# Patient Record
Sex: MALE | Race: WHITE | NOT HISPANIC OR LATINO | Employment: OTHER | URBAN - METROPOLITAN AREA
[De-identification: names, ages, dates, MRNs, and addresses within clinical notes are randomized per-mention and may not be internally consistent; named-entity substitution may affect disease eponyms.]

---

## 2021-06-24 ENCOUNTER — HOSPITAL ENCOUNTER (INPATIENT)
Facility: MEDICAL CENTER | Age: 76
LOS: 2 days | DRG: 176 | End: 2021-06-27
Attending: EMERGENCY MEDICINE | Admitting: INTERNAL MEDICINE
Payer: MEDICARE

## 2021-06-24 ENCOUNTER — HOSPITAL ENCOUNTER (OUTPATIENT)
Dept: RADIOLOGY | Facility: MEDICAL CENTER | Age: 76
End: 2021-06-24
Payer: MEDICARE

## 2021-06-24 ENCOUNTER — APPOINTMENT (OUTPATIENT)
Dept: CARDIOLOGY | Facility: MEDICAL CENTER | Age: 76
DRG: 176 | End: 2021-06-24
Attending: STUDENT IN AN ORGANIZED HEALTH CARE EDUCATION/TRAINING PROGRAM
Payer: MEDICARE

## 2021-06-24 DIAGNOSIS — I26.99 ACUTE PULMONARY EMBOLISM, UNSPECIFIED PULMONARY EMBOLISM TYPE, UNSPECIFIED WHETHER ACUTE COR PULMONALE PRESENT (HCC): ICD-10-CM

## 2021-06-24 DIAGNOSIS — I47.20 VENTRICULAR TACHYCARDIA (HCC): ICD-10-CM

## 2021-06-24 PROBLEM — E11.9 TYPE 2 DIABETES MELLITUS (HCC): Status: ACTIVE | Noted: 2021-06-24

## 2021-06-24 PROBLEM — N40.0 BPH (BENIGN PROSTATIC HYPERPLASIA): Status: ACTIVE | Noted: 2021-06-24

## 2021-06-24 PROBLEM — E78.5 DYSLIPIDEMIA: Status: ACTIVE | Noted: 2021-06-24

## 2021-06-24 LAB
ALBUMIN SERPL BCP-MCNC: 4 G/DL (ref 3.2–4.9)
ALBUMIN/GLOB SERPL: 1.3 G/DL
ALP SERPL-CCNC: 77 U/L (ref 30–99)
ALT SERPL-CCNC: 15 U/L (ref 2–50)
ANION GAP SERPL CALC-SCNC: 12 MMOL/L (ref 7–16)
APTT PPP: 125.8 SEC (ref 24.7–36)
AST SERPL-CCNC: 22 U/L (ref 12–45)
BASOPHILS # BLD AUTO: 0.5 % (ref 0–1.8)
BASOPHILS # BLD: 0.03 K/UL (ref 0–0.12)
BILIRUB SERPL-MCNC: 0.7 MG/DL (ref 0.1–1.5)
BUN SERPL-MCNC: 18 MG/DL (ref 8–22)
CALCIUM SERPL-MCNC: 8.6 MG/DL (ref 8.5–10.5)
CHLORIDE SERPL-SCNC: 107 MMOL/L (ref 96–112)
CO2 SERPL-SCNC: 22 MMOL/L (ref 20–33)
CREAT SERPL-MCNC: 0.8 MG/DL (ref 0.5–1.4)
EKG IMPRESSION: NORMAL
EOSINOPHIL # BLD AUTO: 0.15 K/UL (ref 0–0.51)
EOSINOPHIL NFR BLD: 2.4 % (ref 0–6.9)
ERYTHROCYTE [DISTWIDTH] IN BLOOD BY AUTOMATED COUNT: 43.8 FL (ref 35.9–50)
FLUAV RNA SPEC QL NAA+PROBE: NEGATIVE
FLUBV RNA SPEC QL NAA+PROBE: NEGATIVE
GLOBULIN SER CALC-MCNC: 3 G/DL (ref 1.9–3.5)
GLUCOSE BLD-MCNC: 182 MG/DL (ref 65–99)
GLUCOSE SERPL-MCNC: 125 MG/DL (ref 65–99)
HCT VFR BLD AUTO: 40.1 % (ref 42–52)
HGB BLD-MCNC: 14.2 G/DL (ref 14–18)
IMM GRANULOCYTES # BLD AUTO: 0.01 K/UL (ref 0–0.11)
IMM GRANULOCYTES NFR BLD AUTO: 0.2 % (ref 0–0.9)
INR PPP: 1.13 (ref 0.87–1.13)
LV EJECT FRACT  99904: 40
LV EJECT FRACT MOD 2C 99903: 42.45
LV EJECT FRACT MOD 4C 99902: 36.95
LV EJECT FRACT MOD BP 99901: 39.45
LYMPHOCYTES # BLD AUTO: 1.3 K/UL (ref 1–4.8)
LYMPHOCYTES NFR BLD: 20.9 % (ref 22–41)
MAGNESIUM SERPL-MCNC: 2.1 MG/DL (ref 1.5–2.5)
MCH RBC QN AUTO: 33.2 PG (ref 27–33)
MCHC RBC AUTO-ENTMCNC: 35.4 G/DL (ref 33.7–35.3)
MCV RBC AUTO: 93.7 FL (ref 81.4–97.8)
MONOCYTES # BLD AUTO: 0.44 K/UL (ref 0–0.85)
MONOCYTES NFR BLD AUTO: 7.1 % (ref 0–13.4)
NEUTROPHILS # BLD AUTO: 4.3 K/UL (ref 1.82–7.42)
NEUTROPHILS NFR BLD: 68.9 % (ref 44–72)
NRBC # BLD AUTO: 0 K/UL
NRBC BLD-RTO: 0 /100 WBC
PLATELET # BLD AUTO: 120 K/UL (ref 164–446)
PMV BLD AUTO: 8.8 FL (ref 9–12.9)
POTASSIUM SERPL-SCNC: 3.6 MMOL/L (ref 3.6–5.5)
PROT SERPL-MCNC: 7 G/DL (ref 6–8.2)
PROTHROMBIN TIME: 14.2 SEC (ref 12–14.6)
RBC # BLD AUTO: 4.28 M/UL (ref 4.7–6.1)
RSV RNA SPEC QL NAA+PROBE: NEGATIVE
SARS-COV-2 RNA RESP QL NAA+PROBE: NOTDETECTED
SODIUM SERPL-SCNC: 141 MMOL/L (ref 135–145)
SPECIMEN SOURCE: NORMAL
TROPONIN T SERPL-MCNC: 14 NG/L (ref 6–19)
UFH PPP CHRO-ACNC: 0.4 IU/ML
UFH PPP CHRO-ACNC: 0.58 IU/ML
WBC # BLD AUTO: 6.2 K/UL (ref 4.8–10.8)

## 2021-06-24 PROCEDURE — 93010 ELECTROCARDIOGRAM REPORT: CPT | Performed by: INTERNAL MEDICINE

## 2021-06-24 PROCEDURE — 99219 PR INITIAL OBSERVATION CARE,LEVL II: CPT | Performed by: INTERNAL MEDICINE

## 2021-06-24 PROCEDURE — 700111 HCHG RX REV CODE 636 W/ 250 OVERRIDE (IP): Performed by: INTERNAL MEDICINE

## 2021-06-24 PROCEDURE — 96365 THER/PROPH/DIAG IV INF INIT: CPT

## 2021-06-24 PROCEDURE — 80053 COMPREHEN METABOLIC PANEL: CPT

## 2021-06-24 PROCEDURE — 99204 OFFICE O/P NEW MOD 45 MIN: CPT | Performed by: STUDENT IN AN ORGANIZED HEALTH CARE EDUCATION/TRAINING PROGRAM

## 2021-06-24 PROCEDURE — 85520 HEPARIN ASSAY: CPT

## 2021-06-24 PROCEDURE — 85610 PROTHROMBIN TIME: CPT

## 2021-06-24 PROCEDURE — 36415 COLL VENOUS BLD VENIPUNCTURE: CPT

## 2021-06-24 PROCEDURE — 700111 HCHG RX REV CODE 636 W/ 250 OVERRIDE (IP): Performed by: EMERGENCY MEDICINE

## 2021-06-24 PROCEDURE — 93306 TTE W/DOPPLER COMPLETE: CPT | Mod: 26 | Performed by: INTERNAL MEDICINE

## 2021-06-24 PROCEDURE — 700111 HCHG RX REV CODE 636 W/ 250 OVERRIDE (IP): Performed by: STUDENT IN AN ORGANIZED HEALTH CARE EDUCATION/TRAINING PROGRAM

## 2021-06-24 PROCEDURE — 96366 THER/PROPH/DIAG IV INF ADDON: CPT

## 2021-06-24 PROCEDURE — G0378 HOSPITAL OBSERVATION PER HR: HCPCS

## 2021-06-24 PROCEDURE — 84484 ASSAY OF TROPONIN QUANT: CPT

## 2021-06-24 PROCEDURE — 82962 GLUCOSE BLOOD TEST: CPT

## 2021-06-24 PROCEDURE — 700105 HCHG RX REV CODE 258: Performed by: EMERGENCY MEDICINE

## 2021-06-24 PROCEDURE — 0241U HCHG SARS-COV-2 COVID-19 NFCT DS RESP RNA 4 TRGT MIC: CPT

## 2021-06-24 PROCEDURE — 700102 HCHG RX REV CODE 250 W/ 637 OVERRIDE(OP): Performed by: INTERNAL MEDICINE

## 2021-06-24 PROCEDURE — 83735 ASSAY OF MAGNESIUM: CPT

## 2021-06-24 PROCEDURE — 96372 THER/PROPH/DIAG INJ SC/IM: CPT

## 2021-06-24 PROCEDURE — 96375 TX/PRO/DX INJ NEW DRUG ADDON: CPT

## 2021-06-24 PROCEDURE — 85730 THROMBOPLASTIN TIME PARTIAL: CPT

## 2021-06-24 PROCEDURE — C9803 HOPD COVID-19 SPEC COLLECT: HCPCS | Performed by: EMERGENCY MEDICINE

## 2021-06-24 PROCEDURE — A9270 NON-COVERED ITEM OR SERVICE: HCPCS | Performed by: INTERNAL MEDICINE

## 2021-06-24 PROCEDURE — 700101 HCHG RX REV CODE 250: Performed by: STUDENT IN AN ORGANIZED HEALTH CARE EDUCATION/TRAINING PROGRAM

## 2021-06-24 PROCEDURE — 99285 EMERGENCY DEPT VISIT HI MDM: CPT

## 2021-06-24 PROCEDURE — 700102 HCHG RX REV CODE 250 W/ 637 OVERRIDE(OP): Performed by: STUDENT IN AN ORGANIZED HEALTH CARE EDUCATION/TRAINING PROGRAM

## 2021-06-24 PROCEDURE — 93005 ELECTROCARDIOGRAM TRACING: CPT | Performed by: EMERGENCY MEDICINE

## 2021-06-24 PROCEDURE — 85025 COMPLETE CBC W/AUTO DIFF WBC: CPT

## 2021-06-24 PROCEDURE — 96368 THER/DIAG CONCURRENT INF: CPT

## 2021-06-24 PROCEDURE — A9270 NON-COVERED ITEM OR SERVICE: HCPCS | Performed by: STUDENT IN AN ORGANIZED HEALTH CARE EDUCATION/TRAINING PROGRAM

## 2021-06-24 PROCEDURE — 96367 TX/PROPH/DG ADDL SEQ IV INF: CPT

## 2021-06-24 PROCEDURE — 93306 TTE W/DOPPLER COMPLETE: CPT

## 2021-06-24 RX ORDER — SODIUM CHLORIDE 9 MG/ML
INJECTION, SOLUTION INTRAVENOUS CONTINUOUS
Status: DISCONTINUED | OUTPATIENT
Start: 2021-06-24 | End: 2021-06-25

## 2021-06-24 RX ORDER — METOPROLOL TARTRATE 1 MG/ML
5 INJECTION, SOLUTION INTRAVENOUS ONCE
Status: COMPLETED | OUTPATIENT
Start: 2021-06-24 | End: 2021-06-24

## 2021-06-24 RX ORDER — ONDANSETRON 2 MG/ML
4 INJECTION INTRAMUSCULAR; INTRAVENOUS EVERY 4 HOURS PRN
Status: DISCONTINUED | OUTPATIENT
Start: 2021-06-24 | End: 2021-06-27 | Stop reason: HOSPADM

## 2021-06-24 RX ORDER — ATORVASTATIN CALCIUM 10 MG/1
10 TABLET, FILM COATED ORAL NIGHTLY
Status: DISCONTINUED | OUTPATIENT
Start: 2021-06-24 | End: 2021-06-24

## 2021-06-24 RX ORDER — DEXTROSE MONOHYDRATE 50 MG/ML
INJECTION, SOLUTION INTRAVENOUS CONTINUOUS
Status: DISCONTINUED | OUTPATIENT
Start: 2021-06-24 | End: 2021-06-26

## 2021-06-24 RX ORDER — BISACODYL 10 MG
10 SUPPOSITORY, RECTAL RECTAL
Status: DISCONTINUED | OUTPATIENT
Start: 2021-06-24 | End: 2021-06-27 | Stop reason: HOSPADM

## 2021-06-24 RX ORDER — TAMSULOSIN HYDROCHLORIDE 0.4 MG/1
0.4 CAPSULE ORAL
Status: DISCONTINUED | OUTPATIENT
Start: 2021-06-25 | End: 2021-06-24

## 2021-06-24 RX ORDER — POLYETHYLENE GLYCOL 3350 17 G/17G
1 POWDER, FOR SOLUTION ORAL
Status: DISCONTINUED | OUTPATIENT
Start: 2021-06-24 | End: 2021-06-27 | Stop reason: HOSPADM

## 2021-06-24 RX ORDER — ONDANSETRON 4 MG/1
4 TABLET, ORALLY DISINTEGRATING ORAL EVERY 4 HOURS PRN
Status: DISCONTINUED | OUTPATIENT
Start: 2021-06-24 | End: 2021-06-27 | Stop reason: HOSPADM

## 2021-06-24 RX ORDER — ACETAMINOPHEN 325 MG/1
650 TABLET ORAL EVERY 6 HOURS PRN
Status: DISCONTINUED | OUTPATIENT
Start: 2021-06-24 | End: 2021-06-27 | Stop reason: HOSPADM

## 2021-06-24 RX ORDER — TAMSULOSIN HYDROCHLORIDE 0.4 MG/1
0.4 CAPSULE ORAL
COMMUNITY

## 2021-06-24 RX ORDER — METOPROLOL SUCCINATE 25 MG/1
25 TABLET, EXTENDED RELEASE ORAL
Status: DISCONTINUED | OUTPATIENT
Start: 2021-06-24 | End: 2021-06-26

## 2021-06-24 RX ORDER — TAMSULOSIN HYDROCHLORIDE 0.4 MG/1
0.4 CAPSULE ORAL
Status: DISCONTINUED | OUTPATIENT
Start: 2021-06-24 | End: 2021-06-27 | Stop reason: HOSPADM

## 2021-06-24 RX ORDER — ATORVASTATIN CALCIUM 10 MG/1
10 TABLET, FILM COATED ORAL NIGHTLY
Status: ON HOLD | COMMUNITY
End: 2021-06-27

## 2021-06-24 RX ORDER — MORPHINE SULFATE 4 MG/ML
4 INJECTION, SOLUTION INTRAMUSCULAR; INTRAVENOUS
Status: DISCONTINUED | OUTPATIENT
Start: 2021-06-24 | End: 2021-06-27 | Stop reason: HOSPADM

## 2021-06-24 RX ORDER — OMEPRAZOLE 20 MG/1
40 CAPSULE, DELAYED RELEASE ORAL DAILY
Status: DISCONTINUED | OUTPATIENT
Start: 2021-06-25 | End: 2021-06-27 | Stop reason: HOSPADM

## 2021-06-24 RX ORDER — HEPARIN SODIUM 5000 [USP'U]/100ML
0-30 INJECTION, SOLUTION INTRAVENOUS CONTINUOUS
Status: DISCONTINUED | OUTPATIENT
Start: 2021-06-24 | End: 2021-06-27

## 2021-06-24 RX ORDER — ATORVASTATIN CALCIUM 40 MG/1
40 TABLET, FILM COATED ORAL NIGHTLY
Status: DISCONTINUED | OUTPATIENT
Start: 2021-06-24 | End: 2021-06-27 | Stop reason: HOSPADM

## 2021-06-24 RX ORDER — HEPARIN SODIUM 1000 [USP'U]/ML
40 INJECTION, SOLUTION INTRAVENOUS; SUBCUTANEOUS PRN
Status: DISCONTINUED | OUTPATIENT
Start: 2021-06-24 | End: 2021-06-27

## 2021-06-24 RX ORDER — ENALAPRILAT 1.25 MG/ML
1.25 INJECTION INTRAVENOUS EVERY 6 HOURS PRN
Status: DISCONTINUED | OUTPATIENT
Start: 2021-06-24 | End: 2021-06-27 | Stop reason: HOSPADM

## 2021-06-24 RX ORDER — HEPARIN SODIUM 1000 [USP'U]/ML
80 INJECTION, SOLUTION INTRAVENOUS; SUBCUTANEOUS ONCE
Status: DISCONTINUED | OUTPATIENT
Start: 2021-06-24 | End: 2021-06-24

## 2021-06-24 RX ORDER — HEPARIN SODIUM 5000 [USP'U]/100ML
0-30 INJECTION, SOLUTION INTRAVENOUS CONTINUOUS
Status: DISCONTINUED | OUTPATIENT
Start: 2021-06-24 | End: 2021-06-24

## 2021-06-24 RX ORDER — ICOSAPENT ETHYL 1000 MG/1
2 CAPSULE ORAL 2 TIMES DAILY
COMMUNITY

## 2021-06-24 RX ORDER — AMOXICILLIN 250 MG
2 CAPSULE ORAL 2 TIMES DAILY
Status: DISCONTINUED | OUTPATIENT
Start: 2021-06-24 | End: 2021-06-27 | Stop reason: HOSPADM

## 2021-06-24 RX ORDER — LABETALOL HYDROCHLORIDE 5 MG/ML
10 INJECTION, SOLUTION INTRAVENOUS EVERY 4 HOURS PRN
Status: DISCONTINUED | OUTPATIENT
Start: 2021-06-24 | End: 2021-06-27 | Stop reason: HOSPADM

## 2021-06-24 RX ORDER — ICOSAPENT ETHYL 1000 MG/1
2 CAPSULE ORAL 2 TIMES DAILY
Status: DISCONTINUED | OUTPATIENT
Start: 2021-06-24 | End: 2021-06-24

## 2021-06-24 RX ORDER — DEXTROSE MONOHYDRATE 25 G/50ML
50 INJECTION, SOLUTION INTRAVENOUS
Status: DISCONTINUED | OUTPATIENT
Start: 2021-06-24 | End: 2021-06-27 | Stop reason: HOSPADM

## 2021-06-24 RX ORDER — PANTOPRAZOLE SODIUM 40 MG/1
40 TABLET, DELAYED RELEASE ORAL DAILY
COMMUNITY

## 2021-06-24 RX ORDER — OXYCODONE HYDROCHLORIDE 10 MG/1
10 TABLET ORAL
Status: DISCONTINUED | OUTPATIENT
Start: 2021-06-24 | End: 2021-06-27 | Stop reason: HOSPADM

## 2021-06-24 RX ORDER — HEPARIN SODIUM 1000 [USP'U]/ML
40 INJECTION, SOLUTION INTRAVENOUS; SUBCUTANEOUS PRN
Status: DISCONTINUED | OUTPATIENT
Start: 2021-06-24 | End: 2021-06-24

## 2021-06-24 RX ORDER — OXYCODONE HYDROCHLORIDE 5 MG/1
5 TABLET ORAL
Status: DISCONTINUED | OUTPATIENT
Start: 2021-06-24 | End: 2021-06-27 | Stop reason: HOSPADM

## 2021-06-24 RX ADMIN — METOPROLOL SUCCINATE 25 MG: 25 TABLET, EXTENDED RELEASE ORAL at 18:05

## 2021-06-24 RX ADMIN — AMIODARONE HYDROCHLORIDE 1 MG/MIN: 1.8 INJECTION, SOLUTION INTRAVENOUS at 16:30

## 2021-06-24 RX ADMIN — SODIUM CHLORIDE: 9 INJECTION, SOLUTION INTRAVENOUS at 15:46

## 2021-06-24 RX ADMIN — TAMSULOSIN HYDROCHLORIDE 0.4 MG: 0.4 CAPSULE ORAL at 22:14

## 2021-06-24 RX ADMIN — HEPARIN SODIUM 18 UNITS/KG/HR: 5000 INJECTION, SOLUTION INTRAVENOUS at 17:02

## 2021-06-24 RX ADMIN — METOPROLOL TARTRATE 5 MG: 1 INJECTION, SOLUTION INTRAVENOUS at 17:26

## 2021-06-24 RX ADMIN — ATORVASTATIN CALCIUM 40 MG: 40 TABLET, FILM COATED ORAL at 22:14

## 2021-06-24 RX ADMIN — DEXTROSE MONOHYDRATE: 50 INJECTION, SOLUTION INTRAVENOUS at 17:45

## 2021-06-24 RX ADMIN — AMIODARONE HYDROCHLORIDE 150 MG: 1.5 INJECTION, SOLUTION INTRAVENOUS at 17:20

## 2021-06-24 RX ADMIN — AMIODARONE HYDROCHLORIDE 0.5 MG/MIN: 1.8 INJECTION, SOLUTION INTRAVENOUS at 23:10

## 2021-06-24 RX ADMIN — INSULIN HUMAN 1 UNITS: 100 INJECTION, SOLUTION PARENTERAL at 22:16

## 2021-06-24 RX ADMIN — ASPIRIN 81 MG: 81 TABLET, COATED ORAL at 18:05

## 2021-06-24 ASSESSMENT — ENCOUNTER SYMPTOMS
BLURRED VISION: 0
HEARTBURN: 0
NECK PAIN: 0
TREMORS: 0
BACK PAIN: 0
NAUSEA: 0
CHILLS: 0
FOCAL WEAKNESS: 0
HEADACHES: 0
SHORTNESS OF BREATH: 1
SPUTUM PRODUCTION: 0
HALLUCINATIONS: 0
PHOTOPHOBIA: 0
SPEECH CHANGE: 0
ORTHOPNEA: 0
DOUBLE VISION: 0
FEVER: 0
VOMITING: 0
POLYDIPSIA: 0
NERVOUS/ANXIOUS: 0
FLANK PAIN: 0
HEMOPTYSIS: 0
COUGH: 1
WEIGHT LOSS: 0
PALPITATIONS: 0
BRUISES/BLEEDS EASILY: 0

## 2021-06-24 ASSESSMENT — LIFESTYLE VARIABLES: SUBSTANCE_ABUSE: 0

## 2021-06-24 NOTE — ASSESSMENT & PLAN NOTE
IV amiodarone has been discontinued  Transthoracic echo reveals LVEF of 40%  Partha for cardiac catheterization today  Monitor on telemetry  Hold metoprolol for bradycardia

## 2021-06-24 NOTE — CONSULTS
Reason for Consult:  Asked by Dr Madi Jefferson M.D. to see this patient with shortness of breath  Patient's PCP: No primary care provider on file.    CC:   Chief Complaint   Patient presents with   • Shortness of Breath     transfer in with dx PE   • Other     episodes non sustained V-tach.       HPI: Judd Dodson is a 75-year-old man with history of of diabetes, dyslipidemia, and carotid artery disease (medically managed) who presented with shortness of breath that started Sunday. He presented to an ER in [place] due to worsening symptoms, and was diagnosed with PE.  The patient was transferred to [facility] for further management of PE.  While in the ER at [place], there was report of nonsustained V. tach, for which cardiology was consulted.    The patient reports that he started having shortness of breath on Sunday and thought it was due to high altitude.  However, the symptoms worsened and he decided to go to the ER.  The patient was started on heparin drip for PE and amiodarone drip for NSVT and transferred here.      He denies any prior CAD or heart failure.  He denies any chest pressure.  Denies any palpitation.  No orthopnea, PND, or leg swelling.  No syncope or presyncopal episodes.  Of note, bedside echo showed depressed LVEF.  The patient also had a brief nonsustained VT while I was evaluating him.  The patient did not lose consciousness and was asymptomatic during the episode.    Medications / Drug list prior to admission:  No current facility-administered medications on file prior to encounter.     Current Outpatient Medications on File Prior to Encounter   Medication Sig Dispense Refill   • pantoprazole (PROTONIX) 40 MG Tablet Delayed Response Take 40 mg by mouth every day.     • metFORMIN (GLUCOPHAGE) 500 MG Tab Take 500 mg by mouth 2 times a day with meals.     • atorvastatin (LIPITOR) 10 MG Tab Take 10 mg by mouth every evening.     • tamsulosin (FLOMAX) 0.4 MG capsule Take 0.4 mg by mouth 1/2 hour after  breakfast.     • Icosapent Ethyl 1 g Cap Take 2 g by mouth 2 times a day.         Current list of administered Medications:    Current Facility-Administered Medications:   •  NS infusion, , Intravenous, Continuous, Madi Jefferson M.D., Last Rate: 125 mL/hr at 06/24/21 1546, New Bag at 06/24/21 1546  •  dextrose 5% infusion, , Intravenous, Continuous, Madi Jefferson M.D.  •  amiodarone (NEXTERONE) 360 mg/200 mL infusion, 0.5-1 mg/min, Intravenous, Continuous, Keith Rascon M.D., Stopped at 06/24/21 1719  •  heparin infusion 25,000 units in 500 mL 0.45% NACL, 0-30 Units/kg/hr, Intravenous, Continuous, Keith Rascon M.D., Last Rate: 32.4 mL/hr at 06/24/21 1702, 18 Units/kg/hr at 06/24/21 1702  •  heparin injection 3,600 Units, 40 Units/kg, Intravenous, PRN, Keith Rascon M.D.  •  senna-docusate (PERICOLACE or SENOKOT S) 8.6-50 MG per tablet 2 tablet, 2 tablet, Oral, BID **AND** polyethylene glycol/lytes (MIRALAX) PACKET 1 Packet, 1 Packet, Oral, QDAY PRN **AND** magnesium hydroxide (MILK OF MAGNESIA) suspension 30 mL, 30 mL, Oral, QDAY PRN **AND** bisacodyl (DULCOLAX) suppository 10 mg, 10 mg, Rectal, QDAY PRN, Keith Rascon M.D.  •  Respiratory Therapy Consult, , Nebulization, Continuous RT, Keith Rascon M.D.  •  enalaprilat (VASOTEC) injection 1.25 mg, 1.25 mg, Intravenous, Q6HRS PRN, Keith Rascon M.D.  •  labetalol (NORMODYNE/TRANDATE) injection 10 mg, 10 mg, Intravenous, Q4HRS PRN, Keith Rascon M.D.  •  acetaminophen (Tylenol) tablet 650 mg, 650 mg, Oral, Q6HRS PRN, Keith Rascon M.D.  •  Notify provider if pain remains uncontrolled, , , CONTINUOUS **AND** Use the Numeric Rating Scale (NRS), Sanchez-Baker Faces (WBF), or FLACC on regular floors and Critical-Care Pain Observation Tool (CPOT) on ICUs/Trauma to assess pain, , , CONTINUOUS **AND** Pulse Ox, , , CONTINUOUS **AND** Pharmacy Consult Request ...Pain Management Review 1 Each, 1 Each, Other, PHARMACY TO DOSE **AND** If patient  difficult to arouse and/or has respiratory depression (respiratory rate of 10 or less), stop any opiates that are currently infusing and call a Rapid Response., , , CONTINUOUS, Keith Rascon M.D.  •  oxyCODONE immediate-release (ROXICODONE) tablet 5 mg, 5 mg, Oral, Q3HRS PRN **OR** oxyCODONE immediate release (ROXICODONE) tablet 10 mg, 10 mg, Oral, Q3HRS PRN **OR** morphine (pf) 4 mg/mL injection 4 mg, 4 mg, Intravenous, Q3HRS PRN, Keith Rascon M.D.  •  ondansetron (ZOFRAN) syringe/vial injection 4 mg, 4 mg, Intravenous, Q4HRS PRN, Keith Rascon M.D.  •  ondansetron (ZOFRAN ODT) dispertab 4 mg, 4 mg, Oral, Q4HRS PRN, Keith Rascon M.D.  •  [START ON 6/25/2021] omeprazole (PRILOSEC) capsule 40 mg, 40 mg, Oral, DAILY, Keith Rascon M.D.  •  [START ON 6/25/2021] tamsulosin (FLOMAX) capsule 0.4 mg, 0.4 mg, Oral, AFTER BREAKFAST, Keith Rascon M.D.  •  insulin regular (HumuLIN R,NovoLIN R) injection, 1-6 Units, Subcutaneous, 4X/DAY ACHS **AND** POC blood glucose manual result, , , Q AC AND BEDTIME(S) **AND** NOTIFY MD and PharmD, , , Once **AND** glucose 4 g chewable tablet 16 g, 16 g, Oral, Q15 MIN PRN **AND** dextrose 50% (D50W) injection 50 mL, 50 mL, Intravenous, Q15 MIN PRN, Keith Rascon M.D.  •  metoprolol SR (TOPROL XL) tablet 25 mg, 25 mg, Oral, Q DAY, Melva Caldwell M.D.  •  amiodarone (NEXTERONE) IVPB 150 mg, 150 mg, Intravenous, Once, Melva Caldwell M.D., Last Rate: 600 mL/hr at 06/24/21 1720, 150 mg at 06/24/21 1720  •  atorvastatin (LIPITOR) tablet 40 mg, 40 mg, Oral, Nightly, Melva Caldwell M.D.  •  aspirin EC (ECOTRIN) tablet 81 mg, 81 mg, Oral, DAILY, Melva Caldwell M.D.  •  Metoprolol Tartrate (LOPRESSOR) injection 5 mg, 5 mg, Intravenous, Once, Melva Caldwell M.D.    Current Outpatient Medications:   •  pantoprazole (PROTONIX) 40 MG Tablet Delayed Response, Take 40 mg by mouth every day., Disp: , Rfl:   •  metFORMIN (GLUCOPHAGE) 500 MG Tab, Take 500 mg by mouth 2 times a day  with meals., Disp: , Rfl:   •  atorvastatin (LIPITOR) 10 MG Tab, Take 10 mg by mouth every evening., Disp: , Rfl:   •  tamsulosin (FLOMAX) 0.4 MG capsule, Take 0.4 mg by mouth 1/2 hour after breakfast., Disp: , Rfl:   •  Icosapent Ethyl 1 g Cap, Take 2 g by mouth 2 times a day., Disp: , Rfl:     No past medical history on file.    No past surgical history on file.    No family history on file.  Patient family history was personally reviewed, no pertinent family history to current presentation    Social History     Tobacco Use   • Smoking status: Not on file   Substance Use Topics   • Alcohol use: Not on file   • Drug use: Not on file       ALLERGIES:  No Known Allergies    Review of systems:  A complete review of symptoms was reviewed with patient. This is reviewed in H&P and PMH. ALL OTHERS reviewed and negative    Physical exam:  Patient Vitals for the past 24 hrs:   BP Temp Temp src Pulse Resp SpO2 Height Weight   06/24/21 1516 140/86 36.8 °C (98.2 °F) Temporal 76 18 97 % -- --   06/24/21 1512 -- -- -- -- -- -- 1.829 m (6') 90 kg (198 lb 6.6 oz)     General: No acute distress.   EYES: no jaundice  HEENT: OP clear   Neck: No bruits No JVD.   CVS:  RRR. S1 + S2. No M/R/G  Resp: CTAB. No wheezing or crackles/rhonchi.  Abdomen: Soft, NT, ND,  Skin: Grossly nothing acute no obvious rashes  Neurological: Alert, Moves all extremities, no cranial nerve defects on limited exam  Extremities: Pulse 2+ in b/l LE.  No edema. No cyanosis.       Data:  Laboratory studies personally reviewed by me:  Recent Results (from the past 24 hour(s))   CBC w/ Differential    Collection Time: 06/24/21  3:45 PM   Result Value Ref Range    WBC 6.2 4.8 - 10.8 K/uL    RBC 4.28 (L) 4.70 - 6.10 M/uL    Hemoglobin 14.2 14.0 - 18.0 g/dL    Hematocrit 40.1 (L) 42.0 - 52.0 %    MCV 93.7 81.4 - 97.8 fL    MCH 33.2 (H) 27.0 - 33.0 pg    MCHC 35.4 (H) 33.7 - 35.3 g/dL    RDW 43.8 35.9 - 50.0 fL    Platelet Count 120 (L) 164 - 446 K/uL    MPV 8.8 (L)  9.0 - 12.9 fL    Neutrophils-Polys 68.90 44.00 - 72.00 %    Lymphocytes 20.90 (L) 22.00 - 41.00 %    Monocytes 7.10 0.00 - 13.40 %    Eosinophils 2.40 0.00 - 6.90 %    Basophils 0.50 0.00 - 1.80 %    Immature Granulocytes 0.20 0.00 - 0.90 %    Nucleated RBC 0.00 /100 WBC    Neutrophils (Absolute) 4.30 1.82 - 7.42 K/uL    Lymphs (Absolute) 1.30 1.00 - 4.80 K/uL    Monos (Absolute) 0.44 0.00 - 0.85 K/uL    Eos (Absolute) 0.15 0.00 - 0.51 K/uL    Baso (Absolute) 0.03 0.00 - 0.12 K/uL    Immature Granulocytes (abs) 0.01 0.00 - 0.11 K/uL    NRBC (Absolute) 0.00 K/uL   Complete Metabolic Panel (CMP)    Collection Time: 06/24/21  3:45 PM   Result Value Ref Range    Sodium 141 135 - 145 mmol/L    Potassium 3.6 3.6 - 5.5 mmol/L    Chloride 107 96 - 112 mmol/L    Co2 22 20 - 33 mmol/L    Anion Gap 12.0 7.0 - 16.0    Glucose 125 (H) 65 - 99 mg/dL    Bun 18 8 - 22 mg/dL    Creatinine 0.80 0.50 - 1.40 mg/dL    Calcium 8.6 8.5 - 10.5 mg/dL    AST(SGOT) 22 12 - 45 U/L    ALT(SGPT) 15 2 - 50 U/L    Alkaline Phosphatase 77 30 - 99 U/L    Total Bilirubin 0.7 0.1 - 1.5 mg/dL    Albumin 4.0 3.2 - 4.9 g/dL    Total Protein 7.0 6.0 - 8.2 g/dL    Globulin 3.0 1.9 - 3.5 g/dL    A-G Ratio 1.3 g/dL   Troponin STAT    Collection Time: 06/24/21  3:45 PM   Result Value Ref Range    Troponin T 14 6 - 19 ng/L   aPTT    Collection Time: 06/24/21  3:45 PM   Result Value Ref Range    APTT 125.8 (HH) 24.7 - 36.0 sec   Prothrombin Time    Collection Time: 06/24/21  3:45 PM   Result Value Ref Range    PT 14.2 12.0 - 14.6 sec    INR 1.13 0.87 - 1.13   Heparin Xa (Unfractionated)    Collection Time: 06/24/21  3:45 PM   Result Value Ref Range    Heparin Xa (UFH) 0.58 IU/mL   ESTIMATED GFR    Collection Time: 06/24/21  3:45 PM   Result Value Ref Range    GFR If African American >60 >60 mL/min/1.73 m 2    GFR If Non African American >60 >60 mL/min/1.73 m 2   COV-2, FLU A/B, AND RSV BY PCR (2-4 HOURS CEPHEID): Collect NP swab in VTM    Collection Time:  06/24/21  5:05 PM    Specimen: Respirate   Result Value Ref Range    SARS-CoV-2 Source NP Swab        Imaging:  EC-ECHOCARDIOGRAM COMPLETE W/O CONT         OUTSIDE IMAGES-DX CHEST   Final Result              EKG 6/24/2021 (from [place]): personally reviewed by me sinus rhythm, PVCs, NSVT    All pertinent features of laboratory and imaging reviewed including primary images where applicable      Active Problems:    Pulmonary embolism (HCC) POA: Unknown    Ventricular tachycardia (HCC) POA: Unknown    Type 2 diabetes mellitus (HCC) POA: Unknown    Dyslipidemia POA: Unknown    BPH (benign prostatic hyperplasia) POA: Unknown  Resolved Problems:    * No resolved hospital problems. *      Assessment / Plan:    Nonsustained V. Tach  Shortness of breath  -Obtain echocardiogram to assess LVEF and any structural abnormalities  -Continue heparin gtt  -Continue amiodarone gtt  -Start metoprolol XL 25 mg daily  -Continue aspirin and high intensity statin  -If VT recurs, please page on-call cardiology.  Consider lidocaine gtt if NSVT. If sustained, will need to cardiovert and consider urgent coronary angiogram.   -N.p.o. after midnight for left heart cath tomorrow am    I personally discussed his case with  Dr Madi Jefferson M.D.    It is my pleasure to participate in the care of Mr. Dodson.  Please do not hesitate to contact me with questions or concerns.    Melva Caldwell MD   Cardiologist [facility]    6/24/2021    Please note that this dictation was created using voice recognition software. There may be errors I did not discover before finalizing the note.

## 2021-06-24 NOTE — ASSESSMENT & PLAN NOTE
CT of angiography of the chest showed moderate sized PE on the right side, without evidence of saddle emboli.  There are no signs of RV strain.  continue IV heparin, monitor APTT  We will plan to transition patient to oral anticoagulation by discharge, for indefinite treatment

## 2021-06-24 NOTE — ED TRIAGE NOTES
Pt presents to ED via flight [facility] as transfer from outside facility with dx of PE as well as runs of non sustained V-tach. PT states he has been feeling badly with shortness of air for several days. Pt does sound somewhat winded with exertion but does not show other signs of respiratory distress. Pt arrives with Amiodarone infusion 1mg/minute and Heparin infusion.

## 2021-06-24 NOTE — ED NOTES
Med Rec completed: per pt at bedside.     No ORAL antibiotics in last 30 days    Preferred Pharmacy: [facility] P: [phone]    Pt confirmed following allergies:  No Known Allergies     Pt's home medications:   No current facility-administered medications on file prior to encounter.     Medication Sig   • pantoprazole (PROTONIX) 40 MG Tablet Delayed Response Take 40 mg by mouth every day.   • metFORMIN (GLUCOPHAGE) 500 MG Tab Take 500 mg by mouth 2 times a day with meals.   • atorvastatin (LIPITOR) 10 MG Tab Take 10 mg by mouth every evening.   • tamsulosin (FLOMAX) 0.4 MG capsule Take 0.4 mg by mouth 1/2 hour after breakfast.   • Icosapent Ethyl 1 g Cap Take 2 g by mouth 2 times a day.

## 2021-06-24 NOTE — ED PROVIDER NOTES
ED Provider Note    CHIEF COMPLAINT  Chief Complaint   Patient presents with   • Shortness of Breath     transfer in with dx PE   • Other     episodes non sustained V-tach.       HPI  Judd Dodson is a 75 y.o. male who presents with a report that he lives in the [place] area and went to [place] to spend some time the summer as he usually does and since Sunday he has had shortness of breath and presented to [place]'s emergency department where he was found by CT to have bilateral pulmonary emboli.  In addition during his emergency department course there he had runs of nonsustained V. tach and arrives on heparin after receiving initial amiodarone bolus and then drip.  He says that he feels pretty good but nursing informed me that he had another brief run of V. tach.  He denies any leg pain or swelling or prior clots and denies any other complaints    REVIEW OF SYSTEMS  See HPI for further details. All other systems are negative.     PAST MEDICAL HISTORY  No past medical history on file.    FAMILY HISTORY  No family history on file.    SOCIAL HISTORY       SURGICAL HISTORY  No past surgical history on file.    CURRENT MEDICATIONS  Home Medications     Reviewed by Hesham Tripp (Pharmacy Tech) on 06/24/21 at 1555  Med List Status: Complete   Medication Last Dose Status   atorvastatin (LIPITOR) 10 MG Tab 6/23/2021 Active   Icosapent Ethyl 1 g Cap 6/24/2021 Active   metFORMIN (GLUCOPHAGE) 500 MG Tab 6/24/2021 Active   pantoprazole (PROTONIX) 40 MG Tablet Delayed Response 6/24/2021 Active   tamsulosin (FLOMAX) 0.4 MG capsule 6/24/2021 Active                ALLERGIES  No Known Allergies    PHYSICAL EXAM  VITAL SIGNS: /86   Pulse 76   Temp 36.8 °C (98.2 °F) (Temporal)   Resp 18   Ht 1.829 m (6')   Wt 90 kg (198 lb 6.6 oz)   SpO2 97%   BMI 26.91 kg/m²    Constitutional: Well developed, Well nourished, No acute distress, Non-toxic appearance.   HENT: Normocephalic, Atraumatic, Bilateral external ears normal,  Oropharynx is clear mucous membranes are moist. No oral exudates or nasal discharge.   Eyes: Pupils are equal round and reactive, EOMI, Conjunctiva normal, No discharge.   Neck: Normal range of motion, No tenderness, Supple, No stridor. No meningismus.  Lymphatic: No lymphadenopathy noted.   Cardiovascular: Regular rate and rhythm without murmur rub or gallop.  Thorax & Lungs: Clear breath sounds bilaterally without wheezes, rhonchi or rales. There is no chest wall tenderness.   Abdomen: Soft non-tender non-distended. There is no rebound or guarding. No organomegaly is appreciated. Bowel sounds are normal.  Skin: Normal without rash.   Back: No CVA or spinal tenderness.   Extremities: Intact distal pulses, No edema, No tenderness, No cyanosis, No clubbing. Capillary refill is less than 2 seconds.  Musculoskeletal: Good range of motion in all major joints. No tenderness to palpation or major deformities noted.   Neurologic: Alert & oriented x 3, Normal motor function, Normal sensory function, No focal deficits noted. Reflexes are normal.  Psychiatric: Affect normal, Judgment normal, Mood normal. There is no suicidal ideation or patient reported hallucinations.     EKG  EKG is pending    RADIOLOGY/PROCEDURES  The patient's CAT scan was reviewed that was performed prior to arrival on a as pulmonary emboli bilaterally.  Questionable right heart strain    COURSE & MEDICAL DECISION MAKING  Pertinent Labs & Imaging studies reviewed. (See chart for details)  Patient presents as a transfer from [place] emergency department for pulmonary emboli and ventricular tachycardia that is nonsustained.  He arrives with a pulse of 76 and normal sinus on his rhythm strip and we will obtain EKG    I was able to look at his rhythm strip which did show a run of 8 beats of V. tach here in the emergency department and we will keep him on amiodarone drip as well as heparin.  Vital signs at this time are stable however.    Laboratory  evaluation reveals no leukocytosis, shift, anemia, electrolyte derangements or acidosis and troponin is unremarkable.  Echo is ordered and pending and I spoke with Dr. Lindsey, cardiology who will see the patient in consultation.    Additionally I spoke with the [facility] service will admit the patient for telemetry care and continue both amiodarone and heparin drips.  The patient understands this plan of care    Please note a critical care time of 30 minutes is spent in the care of this patient outside of procedure time.  At risk system is cardiac and respiratory      FINAL IMPRESSION  1. Acute pulmonary embolism, unspecified pulmonary embolism type, unspecified whether acute cor pulmonale present (HCC)    2. Ventricular tachycardia (HCC)    3.  Critical care time, 30 minutes         Electronically signed by: Madi Jefferson M.D., 6/24/2021 4:27 PM

## 2021-06-24 NOTE — H&P
Hospital Medicine History & Physical Note    Date of Service  6/24/2021    Primary Care Physician  No primary care provider on file.    Consultants  Cardiology    Code Status  Full Code    Chief Complaint  Chief Complaint   Patient presents with   • Shortness of Breath     transfer in with dx PE   • Other     episodes non sustained V-tach.       History of Presenting Illness  75 y.o. male with  with past medical history of DVT diagnosed 2 years ago treated with Xarelto temporarily, reported history of intracardial thrombosis several years ago treated with Xarelto for 6 weeks, GERD, dyslipidemia, type 2 diabetes, BPH, who presented 6/24/2021 as a transfer from [facility], where he presented earlier today with complaints of shortness of breath for 4 days, after he arrived to altitude 4 days ago, and it has been getting worse.  Type 2 diabetes patient report dyspnea on exertion, denies orthopnea, cough, lower extremity edema, chest pain.  Patient states he has been taking Xarelto recently.  Troponin was normal.  Sodium 142, potassium 3.8, chloride 105, bicarb 24, anion gap 13, glucose 119, BUN 20, creatinine 1.18, calcium 8.9, total bilirubin 1.0, alkaline phosphatase 82, AST 18, ALT 23  CT of angiography of the chest showed moderate sized PE on the right side, without evidence of saddle emboli.  There was no signs of RV strain.  Initially patient was meant to be discharged home for outpatient management of pulmonary embolism, however he started having runs of V. tach and became progressively more which became progressively longer, with the longest 1 minute.  Patient remained asymptomatic..  He was treated with 150 mg of amiodarone bolus and started on heparin drip and amiodarone drip.  EKG showed no acute ST/T changes, there is a left anterior fascicular block and first-degree AV block, heart rate 76, occasional PVCs.  Elevated D-dimer 2.19 noted.  ERP/Dr. Jefferson consulted with cardiology/ Dr Caldwell.  Plan to  continue IV heparin, n.p.o. at midnight for possible heart catheterization.  TTE ordered    Review of Systems  Review of Systems   Constitutional: Negative for chills, fever and weight loss.   HENT: Negative for ear pain, hearing loss and tinnitus.    Eyes: Negative for blurred vision, double vision and photophobia.   Respiratory: Positive for cough and shortness of breath. Negative for hemoptysis and sputum production.    Cardiovascular: Negative for chest pain, palpitations and orthopnea.   Gastrointestinal: Negative for heartburn, nausea and vomiting.   Genitourinary: Negative for dysuria, flank pain, frequency and hematuria.   Musculoskeletal: Negative for back pain, joint pain and neck pain.   Skin: Negative for itching and rash.   Neurological: Negative for tremors, speech change, focal weakness and headaches.   Endo/Heme/Allergies: Negative for environmental allergies and polydipsia. Does not bruise/bleed easily.   Psychiatric/Behavioral: Negative for hallucinations and substance abuse. The patient is not nervous/anxious.        Past Medical History  DVT  Intracardiac thrombus  Type 2 diabetes  Dyslipidemia  BPH    Surgical History  Denies surgical history    Family History  Patient denies family history of heart disease    Social History  Denies smoking, drinking alcohol or using drugs.    Allergies  No Known Allergies    Medications  Prior to Admission Medications   Prescriptions Last Dose Informant Patient Reported? Taking?   Icosapent Ethyl 1 g Cap 6/24/2021 at am Patient Yes Yes   Sig: Take 2 g by mouth 2 times a day.   atorvastatin (LIPITOR) 10 MG Tab 6/23/2021 at pm Patient Yes Yes   Sig: Take 10 mg by mouth every evening.   metFORMIN (GLUCOPHAGE) 500 MG Tab 6/24/2021 at am Patient Yes Yes   Sig: Take 500 mg by mouth 2 times a day with meals.   pantoprazole (PROTONIX) 40 MG Tablet Delayed Response 6/24/2021 at am Patient Yes Yes   Sig: Take 40 mg by mouth every day.   tamsulosin (FLOMAX) 0.4 MG capsule  6/24/2021 at am Patient Yes Yes   Sig: Take 0.4 mg by mouth 1/2 hour after breakfast.      Facility-Administered Medications: None       Physical Exam  Temp:  [36.8 °C (98.2 °F)] 36.8 °C (98.2 °F)  Pulse:  [76] 76  Resp:  [18] 18  BP: (140)/(86) 140/86  SpO2:  [97 %] 97 %    Physical Exam  Vitals and nursing note reviewed.   Constitutional:       General: He is not in acute distress.     Appearance: Normal appearance.   HENT:      Head: Normocephalic and atraumatic.      Nose: Nose normal.      Mouth/Throat:      Mouth: Mucous membranes are moist.   Eyes:      Extraocular Movements: Extraocular movements intact.      Pupils: Pupils are equal, round, and reactive to light.   Cardiovascular:      Rate and Rhythm: Normal rate and regular rhythm.   Pulmonary:      Effort: Pulmonary effort is normal.      Breath sounds: Normal breath sounds.   Abdominal:      General: Abdomen is flat. There is no distension.      Tenderness: There is no abdominal tenderness.   Musculoskeletal:         General: No swelling or deformity. Normal range of motion.      Cervical back: Normal range of motion and neck supple.   Skin:     General: Skin is warm and dry.   Neurological:      General: No focal deficit present.      Mental Status: He is alert and oriented to person, place, and time.   Psychiatric:         Mood and Affect: Mood normal.         Behavior: Behavior normal.         Laboratory:  Recent Labs     06/24/21  1545   WBC 6.2   RBC 4.28*   HEMOGLOBIN 14.2   HEMATOCRIT 40.1*   MCV 93.7   MCH 33.2*   MCHC 35.4*   RDW 43.8   PLATELETCT 120*   MPV 8.8*     Recent Labs     06/24/21  1545   SODIUM 141   POTASSIUM 3.6   CHLORIDE 107   CO2 22   GLUCOSE 125*   BUN 18   CREATININE 0.80   CALCIUM 8.6     Recent Labs     06/24/21  1545   ALTSGPT 15   ASTSGOT 22   ALKPHOSPHAT 77   TBILIRUBIN 0.7   GLUCOSE 125*         No results for input(s): NTPROBNP in the last 72 hours.      Recent Labs     06/24/21  1545   TROPONINT 14        Imaging:  OUTSIDE IMAGES-DX CHEST   Final Result      EC-ECHOCARDIOGRAM COMPLETE W/O CONT    (Results Pending)         Assessment/Plan:  I anticipate this patient is appropriate for observation status at this time.    Pulmonary embolism (HCC)  Assessment & Plan  CT of angiography of the chest showed moderate sized PE on the right side, without evidence of saddle emboli.  There are no signs of RV strain.  Patient is hemodynamically stable  No indication for alteplase administration  Plan to continue IV heparin   Transthoracic echo is pending for further evaluation  We will plan to transition patient to oral anticoagulation by discharge, for indefinite treatment    BPH (benign prostatic hyperplasia)  Assessment & Plan  Continue tamsulosin    Dyslipidemia  Assessment & Plan  Continue Lipitor,  lcosapent    Type 2 diabetes mellitus (HCC)  Assessment & Plan  Will hold Metformin  Insulin sliding scale    Ventricular tachycardia (HCC)  Assessment & Plan  Plan to continue amiodarone  Transthoracic echo  N.p.o. at midnight for possible heart catheterization tomorrow.  Monitor on telemetry

## 2021-06-25 ENCOUNTER — APPOINTMENT (OUTPATIENT)
Dept: CARDIOLOGY | Facility: MEDICAL CENTER | Age: 76
DRG: 176 | End: 2021-06-25
Attending: STUDENT IN AN ORGANIZED HEALTH CARE EDUCATION/TRAINING PROGRAM
Payer: MEDICARE

## 2021-06-25 PROBLEM — I42.9 CARDIOMYOPATHY (HCC): Status: ACTIVE | Noted: 2021-06-25

## 2021-06-25 LAB
ALBUMIN SERPL BCP-MCNC: 3.9 G/DL (ref 3.2–4.9)
ALBUMIN/GLOB SERPL: 1.4 G/DL
ALP SERPL-CCNC: 74 U/L (ref 30–99)
ALT SERPL-CCNC: 15 U/L (ref 2–50)
ANION GAP SERPL CALC-SCNC: 11 MMOL/L (ref 7–16)
APTT PPP: 119.8 SEC (ref 24.7–36)
AST SERPL-CCNC: 23 U/L (ref 12–45)
BASOPHILS # BLD AUTO: 0.5 % (ref 0–1.8)
BASOPHILS # BLD: 0.03 K/UL (ref 0–0.12)
BILIRUB SERPL-MCNC: 0.6 MG/DL (ref 0.1–1.5)
BUN SERPL-MCNC: 14 MG/DL (ref 8–22)
CALCIUM SERPL-MCNC: 8.7 MG/DL (ref 8.5–10.5)
CHLORIDE SERPL-SCNC: 108 MMOL/L (ref 96–112)
CO2 SERPL-SCNC: 22 MMOL/L (ref 20–33)
CREAT SERPL-MCNC: 0.92 MG/DL (ref 0.5–1.4)
EOSINOPHIL # BLD AUTO: 0.27 K/UL (ref 0–0.51)
EOSINOPHIL NFR BLD: 4.4 % (ref 0–6.9)
ERYTHROCYTE [DISTWIDTH] IN BLOOD BY AUTOMATED COUNT: 45.5 FL (ref 35.9–50)
GLOBULIN SER CALC-MCNC: 2.8 G/DL (ref 1.9–3.5)
GLUCOSE BLD-MCNC: 104 MG/DL (ref 65–99)
GLUCOSE BLD-MCNC: 123 MG/DL (ref 65–99)
GLUCOSE BLD-MCNC: 126 MG/DL (ref 65–99)
GLUCOSE BLD-MCNC: 146 MG/DL (ref 65–99)
GLUCOSE SERPL-MCNC: 143 MG/DL (ref 65–99)
HCT VFR BLD AUTO: 40.8 % (ref 42–52)
HGB BLD-MCNC: 13.7 G/DL (ref 14–18)
IMM GRANULOCYTES # BLD AUTO: 0.03 K/UL (ref 0–0.11)
IMM GRANULOCYTES NFR BLD AUTO: 0.5 % (ref 0–0.9)
INR PPP: 1.17 (ref 0.87–1.13)
LYMPHOCYTES # BLD AUTO: 1.8 K/UL (ref 1–4.8)
LYMPHOCYTES NFR BLD: 29.2 % (ref 22–41)
MCH RBC QN AUTO: 32.3 PG (ref 27–33)
MCHC RBC AUTO-ENTMCNC: 33.6 G/DL (ref 33.7–35.3)
MCV RBC AUTO: 96.2 FL (ref 81.4–97.8)
MONOCYTES # BLD AUTO: 0.58 K/UL (ref 0–0.85)
MONOCYTES NFR BLD AUTO: 9.4 % (ref 0–13.4)
NEUTROPHILS # BLD AUTO: 3.45 K/UL (ref 1.82–7.42)
NEUTROPHILS NFR BLD: 56 % (ref 44–72)
NRBC # BLD AUTO: 0 K/UL
NRBC BLD-RTO: 0 /100 WBC
PLATELET # BLD AUTO: 124 K/UL (ref 164–446)
PMV BLD AUTO: 9.1 FL (ref 9–12.9)
POTASSIUM SERPL-SCNC: 3.9 MMOL/L (ref 3.6–5.5)
PROT SERPL-MCNC: 6.7 G/DL (ref 6–8.2)
PROTHROMBIN TIME: 14.6 SEC (ref 12–14.6)
RBC # BLD AUTO: 4.24 M/UL (ref 4.7–6.1)
SODIUM SERPL-SCNC: 141 MMOL/L (ref 135–145)
UFH PPP CHRO-ACNC: 0.62 IU/ML
WBC # BLD AUTO: 6.2 K/UL (ref 4.8–10.8)

## 2021-06-25 PROCEDURE — 85520 HEPARIN ASSAY: CPT

## 2021-06-25 PROCEDURE — 99232 SBSQ HOSP IP/OBS MODERATE 35: CPT | Performed by: STUDENT IN AN ORGANIZED HEALTH CARE EDUCATION/TRAINING PROGRAM

## 2021-06-25 PROCEDURE — 700111 HCHG RX REV CODE 636 W/ 250 OVERRIDE (IP): Performed by: INTERNAL MEDICINE

## 2021-06-25 PROCEDURE — 700105 HCHG RX REV CODE 258: Performed by: EMERGENCY MEDICINE

## 2021-06-25 PROCEDURE — 85610 PROTHROMBIN TIME: CPT

## 2021-06-25 PROCEDURE — 99233 SBSQ HOSP IP/OBS HIGH 50: CPT | Performed by: INTERNAL MEDICINE

## 2021-06-25 PROCEDURE — 770020 HCHG ROOM/CARE - TELE (206)

## 2021-06-25 PROCEDURE — A9270 NON-COVERED ITEM OR SERVICE: HCPCS | Performed by: INTERNAL MEDICINE

## 2021-06-25 PROCEDURE — 85025 COMPLETE CBC W/AUTO DIFF WBC: CPT

## 2021-06-25 PROCEDURE — 94760 N-INVAS EAR/PLS OXIMETRY 1: CPT

## 2021-06-25 PROCEDURE — 700102 HCHG RX REV CODE 250 W/ 637 OVERRIDE(OP): Performed by: INTERNAL MEDICINE

## 2021-06-25 PROCEDURE — 700102 HCHG RX REV CODE 250 W/ 637 OVERRIDE(OP): Performed by: STUDENT IN AN ORGANIZED HEALTH CARE EDUCATION/TRAINING PROGRAM

## 2021-06-25 PROCEDURE — 36415 COLL VENOUS BLD VENIPUNCTURE: CPT

## 2021-06-25 PROCEDURE — A9270 NON-COVERED ITEM OR SERVICE: HCPCS | Performed by: STUDENT IN AN ORGANIZED HEALTH CARE EDUCATION/TRAINING PROGRAM

## 2021-06-25 PROCEDURE — 82962 GLUCOSE BLOOD TEST: CPT | Mod: 91

## 2021-06-25 PROCEDURE — A9270 NON-COVERED ITEM OR SERVICE: HCPCS | Performed by: NURSE PRACTITIONER

## 2021-06-25 PROCEDURE — 700102 HCHG RX REV CODE 250 W/ 637 OVERRIDE(OP): Performed by: NURSE PRACTITIONER

## 2021-06-25 PROCEDURE — 85730 THROMBOPLASTIN TIME PARTIAL: CPT

## 2021-06-25 PROCEDURE — 80053 COMPREHEN METABOLIC PANEL: CPT

## 2021-06-25 RX ORDER — LOSARTAN POTASSIUM 50 MG/1
50 TABLET ORAL
Status: DISCONTINUED | OUTPATIENT
Start: 2021-06-25 | End: 2021-06-27 | Stop reason: HOSPADM

## 2021-06-25 RX ORDER — SODIUM CHLORIDE 9 MG/ML
INJECTION, SOLUTION INTRAVENOUS CONTINUOUS
Status: DISCONTINUED | OUTPATIENT
Start: 2021-06-25 | End: 2021-06-27

## 2021-06-25 RX ADMIN — AMIODARONE HYDROCHLORIDE 0.5 MG/MIN: 1.8 INJECTION, SOLUTION INTRAVENOUS at 10:50

## 2021-06-25 RX ADMIN — TAMSULOSIN HYDROCHLORIDE 0.4 MG: 0.4 CAPSULE ORAL at 17:29

## 2021-06-25 RX ADMIN — ATORVASTATIN CALCIUM 40 MG: 40 TABLET, FILM COATED ORAL at 20:25

## 2021-06-25 RX ADMIN — AMIODARONE HYDROCHLORIDE 0.51 MG/MIN: 1.8 INJECTION, SOLUTION INTRAVENOUS at 20:24

## 2021-06-25 RX ADMIN — LOSARTAN POTASSIUM 50 MG: 50 TABLET, FILM COATED ORAL at 17:29

## 2021-06-25 RX ADMIN — DEXTROSE MONOHYDRATE: 50 INJECTION, SOLUTION INTRAVENOUS at 08:47

## 2021-06-25 RX ADMIN — SODIUM CHLORIDE: 9 INJECTION, SOLUTION INTRAVENOUS at 08:40

## 2021-06-25 RX ADMIN — ACETAMINOPHEN 650 MG: 325 TABLET, FILM COATED ORAL at 14:41

## 2021-06-25 RX ADMIN — HEPARIN SODIUM 18 UNITS/KG/HR: 5000 INJECTION, SOLUTION INTRAVENOUS at 08:45

## 2021-06-25 ASSESSMENT — ENCOUNTER SYMPTOMS
MYALGIAS: 0
SEIZURES: 0
SPUTUM PRODUCTION: 0
NAUSEA: 0
HEADACHES: 0
STRIDOR: 0
DIARRHEA: 0
SHORTNESS OF BREATH: 0
DIAPHORESIS: 0
FLANK PAIN: 0
CHEST TIGHTNESS: 0
BLOOD IN STOOL: 0
APNEA: 0
CHOKING: 0
PALPITATIONS: 0
VOMITING: 0
FOCAL WEAKNESS: 0
BLURRED VISION: 0
SORE THROAT: 0
NECK PAIN: 0
COUGH: 0
BACK PAIN: 0
FEVER: 0
WHEEZING: 0
CHILLS: 0
DIZZINESS: 0
ABDOMINAL PAIN: 0
BRUISES/BLEEDS EASILY: 0

## 2021-06-25 ASSESSMENT — FIBROSIS 4 INDEX
FIB4 SCORE: 3.64
FIB4 SCORE: 3.48

## 2021-06-25 ASSESSMENT — COGNITIVE AND FUNCTIONAL STATUS - GENERAL
MOBILITY SCORE: 24
DAILY ACTIVITIY SCORE: 24
SUGGESTED CMS G CODE MODIFIER MOBILITY: CH
SUGGESTED CMS G CODE MODIFIER DAILY ACTIVITY: CH

## 2021-06-25 ASSESSMENT — COPD QUESTIONNAIRES
DO YOU EVER COUGH UP ANY MUCUS OR PHLEGM?: NO/ONLY WITH OCCASIONAL COLDS OR INFECTIONS
DURING THE PAST 4 WEEKS HOW MUCH DID YOU FEEL SHORT OF BREATH: NONE/LITTLE OF THE TIME
COPD SCREENING SCORE: 3
HAVE YOU SMOKED AT LEAST 100 CIGARETTES IN YOUR ENTIRE LIFE: NO/DON'T KNOW

## 2021-06-25 ASSESSMENT — LIFESTYLE VARIABLES
ON A TYPICAL DAY WHEN YOU DRINK ALCOHOL HOW MANY DRINKS DO YOU HAVE: 0
ALCOHOL_USE: NO
TOTAL SCORE: 0
EVER HAD A DRINK FIRST THING IN THE MORNING TO STEADY YOUR NERVES TO GET RID OF A HANGOVER: NO
AVERAGE NUMBER OF DAYS PER WEEK YOU HAVE A DRINK CONTAINING ALCOHOL: 0
TOTAL SCORE: 0
HAVE PEOPLE ANNOYED YOU BY CRITICIZING YOUR DRINKING: NO
EVER FELT BAD OR GUILTY ABOUT YOUR DRINKING: NO
HOW MANY TIMES IN THE PAST YEAR HAVE YOU HAD 5 OR MORE DRINKS IN A DAY: 0
TOTAL SCORE: 0
HAVE YOU EVER FELT YOU SHOULD CUT DOWN ON YOUR DRINKING: NO
CONSUMPTION TOTAL: NEGATIVE
DOES PATIENT WANT TO STOP DRINKING: NO

## 2021-06-25 ASSESSMENT — PATIENT HEALTH QUESTIONNAIRE - PHQ9
SUM OF ALL RESPONSES TO PHQ9 QUESTIONS 1 AND 2: 0
1. LITTLE INTEREST OR PLEASURE IN DOING THINGS: NOT AT ALL
2. FEELING DOWN, DEPRESSED, IRRITABLE, OR HOPELESS: NOT AT ALL

## 2021-06-25 ASSESSMENT — PAIN DESCRIPTION - PAIN TYPE
TYPE: ACUTE PAIN
TYPE: ACUTE PAIN

## 2021-06-25 NOTE — DISCHARGE PLANNING
Anticipated Discharge Disposition: TBD    Action: pt disposition pending clinical course and medical clearance, current dc needs unlikely but currently unknown at this time.    Barriers to Discharge: Medical clearance    Plan: f/u with medical team and pt to discuss dc needs and barriers

## 2021-06-25 NOTE — ED NOTES
Pt semi reclined in bed, awake and speaking without signs of distress. Denies pain or discomfort, denies increase in shortness of air. Denies other needs at this time.

## 2021-06-25 NOTE — PROGRESS NOTES
Cardiology Follow Up Progress Note    Date of Service  6/25/2021    Attending Physician  Mohit Farias M.D.    Chief Complaint       HPI  Judd Dodson is a 75 y.o. male with a  PMH significant for DVT (2 years ago treated with Xarelto), history of intracardiac thrombosis, previously treated with Xarelto, dyslipidemia, type 2 diabetes presented with shortness of breath found to have moderate-sized pulmonary emboli on CTA chest without evidence of RV strain.    Patient was transferred from [place].    Cardiology consultation for nonsustained V. Tach.      Interim Events    No overnight cardiac events.  No recurrent V. tach on IV amiodarone.  Keep n.p.o.  Plan for LHC this afternoon  Keep K above 4, mag above 2  /79  LVEF 40%      Review of Systems  Review of Systems   Respiratory: Negative for apnea, cough, choking, chest tightness, shortness of breath, wheezing and stridor.        Vital signs in last 24 hours  Temp:  [36.1 °C (97 °F)-36.8 °C (98.2 °F)] 36.6 °C (97.8 °F)  Pulse:  [57-79] 57  Resp:  [14-65] 16  BP: (106-155)/(55-94) 143/79  SpO2:  [92 %-100 %] 94 %    Physical Exam  Physical Exam  Cardiovascular:      Rate and Rhythm: Normal rate and regular rhythm.      Pulses: Normal pulses.   Pulmonary:      Effort: Pulmonary effort is normal.   Skin:     General: Skin is warm.   Neurological:      Mental Status: He is alert. Mental status is at baseline.   Psychiatric:         Mood and Affect: Mood normal.         Lab Review  Lab Results   Component Value Date/Time    WBC 6.2 06/25/2021 01:08 AM    RBC 4.24 (L) 06/25/2021 01:08 AM    HEMOGLOBIN 13.7 (L) 06/25/2021 01:08 AM    HEMATOCRIT 40.8 (L) 06/25/2021 01:08 AM    MCV 96.2 06/25/2021 01:08 AM    MCH 32.3 06/25/2021 01:08 AM    MCHC 33.6 (L) 06/25/2021 01:08 AM    MPV 9.1 06/25/2021 01:08 AM      Lab Results   Component Value Date/Time    SODIUM 141 06/25/2021 01:08 AM    POTASSIUM 3.9 06/25/2021 01:08 AM    CHLORIDE 108 06/25/2021 01:08 AM    CO2 22  06/25/2021 01:08 AM    GLUCOSE 143 (H) 06/25/2021 01:08 AM    BUN 14 06/25/2021 01:08 AM    CREATININE 0.92 06/25/2021 01:08 AM      Lab Results   Component Value Date/Time    ASTSGOT 23 06/25/2021 01:08 AM    ALTSGPT 15 06/25/2021 01:08 AM     Lab Results   Component Value Date/Time    TROPONINT 14 06/24/2021 03:45 PM       No results for input(s): NTPROBNP in the last 72 hours.    Cardiac Imaging and Procedures Review  EKG: [facility]    Echocardiogram: 6/24/2021  Moderately reduced left ventricular systolic function.  Ejection fraction is measured to be 40%.  Severe hypokinesis of the basal to apical inferior wall, mid anterior   septum.  Aortic sclerosis without stenosis.  Unable to estimate RVSP/RAP.        Cardiac Catheterization: Pending    Imaging  Chest X-Ray:      Stress Test: Not applicable    Assessment/Plan    Nonsustained V. Tach  -NO recurrence.  -On amiodarone drip, started 6/24/2021.  -Plan for [facility] this afternoon  -Keep n.p.o.  -Continue with aspirin 81, atorvastatin 40, Toprol-XL 25 mg  -LVE 40%    Pulmonary embolism  -IV heparin drip  -Transition to DOAC post cath    Cardiomyopathy, LVEF 40%  -GDMT  -Toprol-XL, consider adding ACE-I/ARB    type 2 diabetes  -A1c 6.5  -On Metformin at home    Dyslipidemia  -On atorvastatin    Cardiology will follow along    Thank you for allowing me to participate in the care of this patient.      Please contact me with any questions.    ZOIE Leung.   Cardiologist, [facility]  [phone]

## 2021-06-25 NOTE — CARE PLAN
Problem: Knowledge Deficit - Standard  Goal: Patient and family/care givers will demonstrate understanding of plan of care, disease process/condition, diagnostic tests and medications  Outcome: Progressing  Note: Discussed POC and medications with patient. Pt educated on NPO status for cath lab today. Pt verbalized understanding.    The patient is Watcher - Medium risk of patient condition declining or worsening    Shift Goals  Clinical Goals: cath lab  Patient Goals: cath lab    Progress made toward(s) clinical / shift goals:  yes    Patient is not progressing towards the following goals:

## 2021-06-25 NOTE — PROGRESS NOTES
Call received from [place] in cath lab that they are unable to take pt today and that he may have dinner. Pt rescheduled for tomorrow. Pt and wife updated. Dr. Farias updated. Orders received for diet and NPO at midnight.

## 2021-06-25 NOTE — PROGRESS NOTES
Bedside report received. Patient A&O x4. KHAI.  on the monitor. POC discussed with patient. Pt NPO for cath lab procedure today. Patient verbalized understanding. Call light and belongings within reach. Bed locked and in lowest position, alarm and fall precautions in place.

## 2021-06-25 NOTE — ED NOTES
Pt awake, had been standing at bedside to use urinal. Pt sat back in bed and began V-tach on monitor which lasted approx 1 minute. Cardiologist on floor and came to bedside. Pt remains awake, speaking without signs of distress and denies current pain or discomfort. Pt Zoll combo pads applied.

## 2021-06-25 NOTE — PROGRESS NOTES
Hospital Medicine Daily Progress Note    Date of Service  6/25/2021    Chief Complaint  75 y.o. male admitted 6/24/2021 with shortness of breath    Hospital Course  75 y.o. male with  with past medical history of DVT diagnosed 2 years ago treated with Xarelto temporarily, reported history of intracardial thrombosis several years ago treated with Xarelto for 6 weeks, GERD, dyslipidemia, type 2 diabetes, who was found to have a moderate-sized PE in the right side on CTA chest without evidence of RV strain and was started on IV heparin.  He was noted to have multiple runs of V. tach for which he was started on IV amiodarone.    Interval Problem Update  Patient denies any active chest pain.  His LVEF was found to be 40% with severe hypokinesis of the basal to apical inferior wall and mid anterior septum.  He is planned to undergo cardiac catheterization today.      Consultants/Specialty  Cardiology    Code Status  Full Code    Disposition  Likely DC home tomorrow    Review of Systems  Review of Systems   Constitutional: Negative for chills, diaphoresis and fever.   HENT: Negative for hearing loss and sore throat.    Eyes: Negative for blurred vision.   Respiratory: Negative for cough, sputum production, shortness of breath and wheezing.    Cardiovascular: Negative for chest pain, palpitations and leg swelling.   Gastrointestinal: Negative for abdominal pain, blood in stool, diarrhea, nausea and vomiting.   Genitourinary: Negative for dysuria, flank pain and urgency.   Musculoskeletal: Negative for back pain, joint pain, myalgias and neck pain.   Skin: Negative for rash.   Neurological: Negative for dizziness, focal weakness, seizures and headaches.   Endo/Heme/Allergies: Does not bruise/bleed easily.   Psychiatric/Behavioral: Negative for suicidal ideas.   All other systems reviewed and are negative.       Physical Exam  Temp:  [36.1 °C (97 °F)-36.8 °C (98.2 °F)] 36.6 °C (97.8 °F)  Pulse:  [57-79] 57  Resp:  [14-65]  16  BP: (106-155)/(55-94) 143/79  SpO2:  [92 %-100 %] 94 %    Physical Exam  Vitals and nursing note reviewed.   Constitutional:       General: He is not in acute distress.     Appearance: Normal appearance.   HENT:      Head: Normocephalic and atraumatic.      Nose: Nose normal.      Mouth/Throat:      Mouth: Mucous membranes are moist.   Eyes:      Extraocular Movements: Extraocular movements intact.      Conjunctiva/sclera: Conjunctivae normal.      Pupils: Pupils are equal, round, and reactive to light.   Cardiovascular:      Rate and Rhythm: Normal rate and regular rhythm.      Pulses: Normal pulses.      Heart sounds: Normal heart sounds.   Pulmonary:      Effort: Pulmonary effort is normal. No respiratory distress.      Breath sounds: Normal breath sounds. No wheezing, rhonchi or rales.   Abdominal:      General: Bowel sounds are normal. There is no distension.      Palpations: Abdomen is soft.      Tenderness: There is no abdominal tenderness.   Musculoskeletal:         General: No swelling or tenderness. Normal range of motion.      Cervical back: Normal range of motion and neck supple.   Lymphadenopathy:      Cervical: No cervical adenopathy.   Skin:     General: Skin is warm.      Coloration: Skin is not jaundiced.      Findings: No rash.   Neurological:      General: No focal deficit present.      Mental Status: He is alert and oriented to person, place, and time.      Cranial Nerves: No cranial nerve deficit.      Motor: No weakness.   Psychiatric:         Mood and Affect: Mood normal.         Behavior: Behavior normal.         Fluids    Intake/Output Summary (Last 24 hours) at 6/25/2021 1214  Last data filed at 6/25/2021 0222  Gross per 24 hour   Intake --   Output 800 ml   Net -800 ml       Laboratory  Recent Labs     06/24/21  1545 06/25/21  0108   WBC 6.2 6.2   RBC 4.28* 4.24*   HEMOGLOBIN 14.2 13.7*   HEMATOCRIT 40.1* 40.8*   MCV 93.7 96.2   MCH 33.2* 32.3   MCHC 35.4* 33.6*   RDW 43.8 45.5    PLATELETCT 120* 124*   MPV 8.8* 9.1     Recent Labs     06/24/21  1545 06/25/21  0108   SODIUM 141 141   POTASSIUM 3.6 3.9   CHLORIDE 107 108   CO2 22 22   GLUCOSE 125* 143*   BUN 18 14   CREATININE 0.80 0.92   CALCIUM 8.6 8.7     Recent Labs     06/24/21  1545 06/25/21  0957   APTT 125.8* 119.8*   INR 1.13 1.17*               Imaging  EC-ECHOCARDIOGRAM COMPLETE W/O CONT   Final Result      OUTSIDE IMAGES-DX CHEST   Final Result      CL-LEFT HEART CATHETERIZATION WITH POSSIBLE INTERVENTION    (Results Pending)        Assessment/Plan  * Pulmonary embolism (HCC)  Assessment & Plan  CT of angiography of the chest showed moderate sized PE on the right side, without evidence of saddle emboli.  There are no signs of RV strain.  continue IV heparin, monitor APTT  We will plan to transition patient to oral anticoagulation by discharge, for indefinite treatment    Cardiomyopathy (HCC)  Assessment & Plan  LVEF 40% with severe hypokinesis of the basal to apical inferior wall and mid anterior septum  Plan for cardiac catheterization today  Started on Toprol    BPH (benign prostatic hyperplasia)  Assessment & Plan  Continue tamsulosin    Dyslipidemia  Assessment & Plan  Continue Lipitor,  lcosapent    Type 2 diabetes mellitus (HCC)  Assessment & Plan  Will hold Metformin  Insulin sliding scale    Ventricular tachycardia (HCC)  Assessment & Plan  Continue IV amiodarone  Transthoracic echo reveals LVEF of 40%  Partha for cardiac catheterization today  Monitor on telemetry  Darted on Toprol       VTE prophylaxis: heparin

## 2021-06-25 NOTE — ASSESSMENT & PLAN NOTE
LVEF 40% with severe hypokinesis of the basal to apical inferior wall and mid anterior septum  Plan for cardiac catheterization today  Started on Toprol

## 2021-06-25 NOTE — PROGRESS NOTES
4 Eyes Skin Assessment Completed by iván   RN and RIGOBERTO perales.    Head WDL  Ears WDL  Nose WDL  Mouth WDL  Neck WDL  Breast/Chest WDL  Shoulder Blades WDL  Spine WDL  (R) Arm/Elbow/Hand WDL  (L) Arm/Elbow/Hand WDL  Abdomen WDL  Groin WDL  Scrotum/Coccyx/Buttocks WDL  (R) Leg WDL  (L) Leg WDL  (R) Heel/Foot/Toe WDL  (L) Heel/Foot/Toe WDL          Devices In Places Tele Box and Pulse Ox      Interventions In Place NC W/Ear Foams and Q2 Turns    Possible Skin Injury No    Pictures Uploaded Into Epic N/A  Wound Consult Placed N/A  RN Wound Prevention Protocol Ordered No

## 2021-06-26 ENCOUNTER — APPOINTMENT (OUTPATIENT)
Dept: CARDIOLOGY | Facility: MEDICAL CENTER | Age: 76
DRG: 176 | End: 2021-06-26
Attending: STUDENT IN AN ORGANIZED HEALTH CARE EDUCATION/TRAINING PROGRAM
Payer: MEDICARE

## 2021-06-26 LAB
ANION GAP SERPL CALC-SCNC: 11 MMOL/L (ref 7–16)
BASOPHILS # BLD AUTO: 0.7 % (ref 0–1.8)
BASOPHILS # BLD: 0.04 K/UL (ref 0–0.12)
BUN SERPL-MCNC: 10 MG/DL (ref 8–22)
CALCIUM SERPL-MCNC: 8.6 MG/DL (ref 8.5–10.5)
CHLORIDE SERPL-SCNC: 109 MMOL/L (ref 96–112)
CO2 SERPL-SCNC: 21 MMOL/L (ref 20–33)
CREAT SERPL-MCNC: 0.74 MG/DL (ref 0.5–1.4)
EKG IMPRESSION: NORMAL
EOSINOPHIL # BLD AUTO: 0.26 K/UL (ref 0–0.51)
EOSINOPHIL NFR BLD: 4.7 % (ref 0–6.9)
ERYTHROCYTE [DISTWIDTH] IN BLOOD BY AUTOMATED COUNT: 46.2 FL (ref 35.9–50)
GLUCOSE BLD-MCNC: 134 MG/DL (ref 65–99)
GLUCOSE BLD-MCNC: 141 MG/DL (ref 65–99)
GLUCOSE BLD-MCNC: 143 MG/DL (ref 65–99)
GLUCOSE BLD-MCNC: 98 MG/DL (ref 65–99)
GLUCOSE SERPL-MCNC: 125 MG/DL (ref 65–99)
HCT VFR BLD AUTO: 38.9 % (ref 42–52)
HGB BLD-MCNC: 13.3 G/DL (ref 14–18)
IMM GRANULOCYTES # BLD AUTO: 0.05 K/UL (ref 0–0.11)
IMM GRANULOCYTES NFR BLD AUTO: 0.9 % (ref 0–0.9)
LYMPHOCYTES # BLD AUTO: 1.38 K/UL (ref 1–4.8)
LYMPHOCYTES NFR BLD: 24.9 % (ref 22–41)
MCH RBC QN AUTO: 32.8 PG (ref 27–33)
MCHC RBC AUTO-ENTMCNC: 34.2 G/DL (ref 33.7–35.3)
MCV RBC AUTO: 96 FL (ref 81.4–97.8)
MONOCYTES # BLD AUTO: 0.46 K/UL (ref 0–0.85)
MONOCYTES NFR BLD AUTO: 8.3 % (ref 0–13.4)
NEUTROPHILS # BLD AUTO: 3.36 K/UL (ref 1.82–7.42)
NEUTROPHILS NFR BLD: 60.5 % (ref 44–72)
NRBC # BLD AUTO: 0 K/UL
NRBC BLD-RTO: 0 /100 WBC
PLATELET # BLD AUTO: 121 K/UL (ref 164–446)
PMV BLD AUTO: 9 FL (ref 9–12.9)
POTASSIUM SERPL-SCNC: 3.6 MMOL/L (ref 3.6–5.5)
RBC # BLD AUTO: 4.05 M/UL (ref 4.7–6.1)
SODIUM SERPL-SCNC: 141 MMOL/L (ref 135–145)
UFH PPP CHRO-ACNC: 0.62 IU/ML
WBC # BLD AUTO: 5.6 K/UL (ref 4.8–10.8)

## 2021-06-26 PROCEDURE — 700102 HCHG RX REV CODE 250 W/ 637 OVERRIDE(OP): Performed by: NURSE PRACTITIONER

## 2021-06-26 PROCEDURE — 770020 HCHG ROOM/CARE - TELE (206)

## 2021-06-26 PROCEDURE — 700102 HCHG RX REV CODE 250 W/ 637 OVERRIDE(OP): Performed by: STUDENT IN AN ORGANIZED HEALTH CARE EDUCATION/TRAINING PROGRAM

## 2021-06-26 PROCEDURE — 33017 PRCRD DRG 6YR+ W/O CGEN CAR: CPT

## 2021-06-26 PROCEDURE — A9270 NON-COVERED ITEM OR SERVICE: HCPCS | Performed by: NURSE PRACTITIONER

## 2021-06-26 PROCEDURE — 85025 COMPLETE CBC W/AUTO DIFF WBC: CPT

## 2021-06-26 PROCEDURE — 700111 HCHG RX REV CODE 636 W/ 250 OVERRIDE (IP): Performed by: INTERNAL MEDICINE

## 2021-06-26 PROCEDURE — 80048 BASIC METABOLIC PNL TOTAL CA: CPT

## 2021-06-26 PROCEDURE — 93005 ELECTROCARDIOGRAM TRACING: CPT | Performed by: INTERNAL MEDICINE

## 2021-06-26 PROCEDURE — A9270 NON-COVERED ITEM OR SERVICE: HCPCS | Performed by: STUDENT IN AN ORGANIZED HEALTH CARE EDUCATION/TRAINING PROGRAM

## 2021-06-26 PROCEDURE — 700102 HCHG RX REV CODE 250 W/ 637 OVERRIDE(OP): Performed by: INTERNAL MEDICINE

## 2021-06-26 PROCEDURE — A9270 NON-COVERED ITEM OR SERVICE: HCPCS | Performed by: INTERNAL MEDICINE

## 2021-06-26 PROCEDURE — 99233 SBSQ HOSP IP/OBS HIGH 50: CPT | Performed by: INTERNAL MEDICINE

## 2021-06-26 PROCEDURE — 85520 HEPARIN ASSAY: CPT

## 2021-06-26 PROCEDURE — 94660 CPAP INITIATION&MGMT: CPT

## 2021-06-26 PROCEDURE — 99232 SBSQ HOSP IP/OBS MODERATE 35: CPT | Performed by: STUDENT IN AN ORGANIZED HEALTH CARE EDUCATION/TRAINING PROGRAM

## 2021-06-26 PROCEDURE — 93010 ELECTROCARDIOGRAM REPORT: CPT | Performed by: INTERNAL MEDICINE

## 2021-06-26 PROCEDURE — 82962 GLUCOSE BLOOD TEST: CPT | Mod: 91

## 2021-06-26 RX ORDER — HEPARIN SODIUM 200 [USP'U]/100ML
INJECTION, SOLUTION INTRAVENOUS
Status: DISPENSED
Start: 2021-06-26 | End: 2021-06-27

## 2021-06-26 RX ORDER — VERAPAMIL HYDROCHLORIDE 2.5 MG/ML
INJECTION, SOLUTION INTRAVENOUS
Status: DISPENSED
Start: 2021-06-26 | End: 2021-06-27

## 2021-06-26 RX ORDER — HEPARIN SODIUM 1000 [USP'U]/ML
INJECTION, SOLUTION INTRAVENOUS; SUBCUTANEOUS
Status: DISPENSED
Start: 2021-06-26 | End: 2021-06-27

## 2021-06-26 RX ORDER — LIDOCAINE HYDROCHLORIDE 20 MG/ML
INJECTION, SOLUTION INFILTRATION; PERINEURAL
Status: DISPENSED
Start: 2021-06-26 | End: 2021-06-27

## 2021-06-26 RX ORDER — AMIODARONE HYDROCHLORIDE 200 MG/1
400 TABLET ORAL TWICE DAILY
Status: DISCONTINUED | OUTPATIENT
Start: 2021-06-26 | End: 2021-06-27

## 2021-06-26 RX ADMIN — ACETAMINOPHEN 650 MG: 325 TABLET, FILM COATED ORAL at 04:03

## 2021-06-26 RX ADMIN — OMEPRAZOLE 40 MG: 20 CAPSULE, DELAYED RELEASE ORAL at 04:45

## 2021-06-26 RX ADMIN — TAMSULOSIN HYDROCHLORIDE 0.4 MG: 0.4 CAPSULE ORAL at 17:01

## 2021-06-26 RX ADMIN — HEPARIN SODIUM 18 UNITS/KG/HR: 5000 INJECTION, SOLUTION INTRAVENOUS at 18:40

## 2021-06-26 RX ADMIN — ATORVASTATIN CALCIUM 40 MG: 40 TABLET, FILM COATED ORAL at 21:08

## 2021-06-26 RX ADMIN — HEPARIN SODIUM 18 UNITS/KG/HR: 5000 INJECTION, SOLUTION INTRAVENOUS at 01:56

## 2021-06-26 RX ADMIN — ASPIRIN 81 MG: 81 TABLET, COATED ORAL at 04:45

## 2021-06-26 RX ADMIN — AMIODARONE HYDROCHLORIDE 400 MG: 200 TABLET ORAL at 17:01

## 2021-06-26 RX ADMIN — AMIODARONE HYDROCHLORIDE 400 MG: 200 TABLET ORAL at 08:53

## 2021-06-26 ASSESSMENT — ENCOUNTER SYMPTOMS
SORE THROAT: 0
SHORTNESS OF BREATH: 0
CHILLS: 0
NECK PAIN: 0
FLANK PAIN: 0
SEIZURES: 0
VOMITING: 0
PALPITATIONS: 0
HEADACHES: 0
FEVER: 0
DIAPHORESIS: 0
NAUSEA: 0
BACK PAIN: 0
BLOOD IN STOOL: 0
SPUTUM PRODUCTION: 0
CHOKING: 0
COUGH: 0
STRIDOR: 0
BLURRED VISION: 0
FOCAL WEAKNESS: 0
DIZZINESS: 0
DIARRHEA: 0
APNEA: 0
ABDOMINAL PAIN: 0
WHEEZING: 0
MYALGIAS: 0
BRUISES/BLEEDS EASILY: 0
CHEST TIGHTNESS: 0

## 2021-06-26 ASSESSMENT — FIBROSIS 4 INDEX: FIB4 SCORE: 3.73

## 2021-06-26 NOTE — PROGRESS NOTES
Bedside report received. Per night RN pt's HR was consistently in the the 30's appeared to be in and out of possible 2nd degree type 2. Pt asymptomatic. Otherwise VS'S. RA. Per cardiologist okay to stop amio gtt and hold BB this morning. Patient A&O x 4. Complains of some mild dyspnea with exertion. NPO since midnight cardiac procedure. POC discussed with patient. Pt verbalizes understanding. Call light and belongings with in reach. Bed locked and in lowest position, alarm and fall precautions in place.

## 2021-06-26 NOTE — PROGRESS NOTES
Notified by telemetry tech that patient has had multiple 2nd Type II beats and is bradying down as low as 43. Currently has amiodarone drip infusing. STAT EKG ordered. Page out to update hospitalist.    0440- EKG showing SB, unable to capture 2nd degree beats. Dr. Caldwell called and updated, verbal orders to D/C amio gtt and hold morning dose of metoprolol.

## 2021-06-26 NOTE — PROGRESS NOTES
Monitor Summary:    SR/SB 52-83, down to 37  Rare PVC/Bigeminy/Trigeminy , 2nd Degree HB beats- non sustained  .20/.08/.41

## 2021-06-26 NOTE — PROGRESS NOTES
Hospital Medicine Daily Progress Note    Date of Service  6/26/2021    Chief Complaint  75 y.o. male admitted 6/24/2021 with shortness of breath    Hospital Course  75 y.o. male with  with past medical history of DVT diagnosed 2 years ago treated with Xarelto temporarily, reported history of intracardial thrombosis several years ago treated with Xarelto for 6 weeks, GERD, dyslipidemia, type 2 diabetes, who was found to have a moderate-sized PE in the right side on CTA chest without evidence of RV strain and was started on IV heparin.  He was noted to have multiple runs of V. tach for which he was started on IV amiodarone.    Interval Problem Update  Patient denies any active chest pain.  His LVEF was found to be 40% with severe hypokinesis of the basal to apical inferior wall and mid anterior septum.  He is planned to undergo cardiac catheterization today.      6/26 patient developed bradycardia overnight with second-degree heart block.  His IV amiodarone was discontinued and metoprolol has been held.  Cardiac cath is pending    Consultants/Specialty  Cardiology    Code Status  Full Code    Disposition  Likely DC home tomorrow    Review of Systems  Review of Systems   Constitutional: Negative for chills, diaphoresis and fever.   HENT: Negative for hearing loss and sore throat.    Eyes: Negative for blurred vision.   Respiratory: Negative for cough, sputum production, shortness of breath and wheezing.    Cardiovascular: Negative for chest pain, palpitations and leg swelling.   Gastrointestinal: Negative for abdominal pain, blood in stool, diarrhea, nausea and vomiting.   Genitourinary: Negative for dysuria, flank pain and urgency.   Musculoskeletal: Negative for back pain, joint pain, myalgias and neck pain.   Skin: Negative for rash.   Neurological: Negative for dizziness, focal weakness, seizures and headaches.   Endo/Heme/Allergies: Does not bruise/bleed easily.   Psychiatric/Behavioral: Negative for suicidal  ideas.   All other systems reviewed and are negative.       Physical Exam  Temp:  [36.7 °C (98 °F)-37 °C (98.6 °F)] 36.9 °C (98.5 °F)  Pulse:  [57-72] 72  Resp:  [16-18] 16  BP: (132-148)/(67-82) 135/78  SpO2:  [93 %-96 %] 95 %    Physical Exam  Vitals and nursing note reviewed.   Constitutional:       General: He is not in acute distress.     Appearance: Normal appearance.   HENT:      Head: Normocephalic and atraumatic.      Nose: Nose normal.      Mouth/Throat:      Mouth: Mucous membranes are moist.   Eyes:      Extraocular Movements: Extraocular movements intact.      Conjunctiva/sclera: Conjunctivae normal.      Pupils: Pupils are equal, round, and reactive to light.   Cardiovascular:      Rate and Rhythm: Normal rate and regular rhythm.      Pulses: Normal pulses.      Heart sounds: Normal heart sounds.   Pulmonary:      Effort: Pulmonary effort is normal. No respiratory distress.      Breath sounds: Normal breath sounds. No wheezing, rhonchi or rales.   Abdominal:      General: Bowel sounds are normal. There is no distension.      Palpations: Abdomen is soft.      Tenderness: There is no abdominal tenderness.   Musculoskeletal:         General: No swelling or tenderness. Normal range of motion.      Cervical back: Normal range of motion and neck supple.   Lymphadenopathy:      Cervical: No cervical adenopathy.   Skin:     General: Skin is warm.      Coloration: Skin is not jaundiced.      Findings: No rash.   Neurological:      General: No focal deficit present.      Mental Status: He is alert and oriented to person, place, and time.      Cranial Nerves: No cranial nerve deficit.      Motor: No weakness.   Psychiatric:         Mood and Affect: Mood normal.         Behavior: Behavior normal.         Fluids    Intake/Output Summary (Last 24 hours) at 6/26/2021 1258  Last data filed at 6/26/2021 0834  Gross per 24 hour   Intake --   Output 2600 ml   Net -2600 ml       Laboratory  Recent Labs     06/24/21  2616  06/25/21  0108 06/26/21  0118   WBC 6.2 6.2 5.6   RBC 4.28* 4.24* 4.05*   HEMOGLOBIN 14.2 13.7* 13.3*   HEMATOCRIT 40.1* 40.8* 38.9*   MCV 93.7 96.2 96.0   MCH 33.2* 32.3 32.8   MCHC 35.4* 33.6* 34.2   RDW 43.8 45.5 46.2   PLATELETCT 120* 124* 121*   MPV 8.8* 9.1 9.0     Recent Labs     06/24/21  1545 06/25/21  0108 06/26/21  0118   SODIUM 141 141 141   POTASSIUM 3.6 3.9 3.6   CHLORIDE 107 108 109   CO2 22 22 21   GLUCOSE 125* 143* 125*   BUN 18 14 10   CREATININE 0.80 0.92 0.74   CALCIUM 8.6 8.7 8.6     Recent Labs     06/24/21  1545 06/25/21  0957   APTT 125.8* 119.8*   INR 1.13 1.17*               Imaging  EC-ECHOCARDIOGRAM COMPLETE W/O CONT   Final Result      OUTSIDE IMAGES-DX CHEST   Final Result      CL-LEFT HEART CATHETERIZATION WITH POSSIBLE INTERVENTION    (Results Pending)        Assessment/Plan  * Pulmonary embolism (HCC)  Assessment & Plan  CT of angiography of the chest showed moderate sized PE on the right side, without evidence of saddle emboli.  There are no signs of RV strain.  continue IV heparin, monitor APTT  We will plan to transition patient to oral anticoagulation by discharge, for indefinite treatment    Cardiomyopathy (HCC)  Assessment & Plan  LVEF 40% with severe hypokinesis of the basal to apical inferior wall and mid anterior septum  Plan for cardiac catheterization today  Started on Toprol    BPH (benign prostatic hyperplasia)  Assessment & Plan  Continue tamsulosin    Dyslipidemia  Assessment & Plan  Continue Lipitor,  lcosapent    Type 2 diabetes mellitus (HCC)  Assessment & Plan  Will hold Metformin  Insulin sliding scale    Ventricular tachycardia (HCC)  Assessment & Plan  IV amiodarone has been discontinued  Transthoracic echo reveals LVEF of 40%  Partha for cardiac catheterization today  Monitor on telemetry  Hold metoprolol for bradycardia       VTE prophylaxis: heparin

## 2021-06-27 ENCOUNTER — PHARMACY VISIT (OUTPATIENT)
Dept: PHARMACY | Facility: MEDICAL CENTER | Age: 76
End: 2021-06-27
Payer: COMMERCIAL

## 2021-06-27 ENCOUNTER — APPOINTMENT (OUTPATIENT)
Dept: CARDIOLOGY | Facility: MEDICAL CENTER | Age: 76
DRG: 176 | End: 2021-06-27
Attending: STUDENT IN AN ORGANIZED HEALTH CARE EDUCATION/TRAINING PROGRAM
Payer: MEDICARE

## 2021-06-27 VITALS
TEMPERATURE: 97.2 F | HEART RATE: 74 BPM | RESPIRATION RATE: 18 BRPM | HEIGHT: 72 IN | SYSTOLIC BLOOD PRESSURE: 129 MMHG | OXYGEN SATURATION: 98 % | WEIGHT: 205.25 LBS | DIASTOLIC BLOOD PRESSURE: 71 MMHG | BODY MASS INDEX: 27.8 KG/M2

## 2021-06-27 PROBLEM — I47.20 VENTRICULAR TACHYCARDIA (HCC): Status: RESOLVED | Noted: 2021-06-24 | Resolved: 2021-06-27

## 2021-06-27 LAB
ANION GAP SERPL CALC-SCNC: 12 MMOL/L (ref 7–16)
BASOPHILS # BLD AUTO: 0.4 % (ref 0–1.8)
BASOPHILS # BLD: 0.02 K/UL (ref 0–0.12)
BUN SERPL-MCNC: 11 MG/DL (ref 8–22)
CALCIUM SERPL-MCNC: 8.6 MG/DL (ref 8.5–10.5)
CHLORIDE SERPL-SCNC: 108 MMOL/L (ref 96–112)
CO2 SERPL-SCNC: 20 MMOL/L (ref 20–33)
CREAT SERPL-MCNC: 0.85 MG/DL (ref 0.5–1.4)
EOSINOPHIL # BLD AUTO: 0.15 K/UL (ref 0–0.51)
EOSINOPHIL NFR BLD: 3 % (ref 0–6.9)
ERYTHROCYTE [DISTWIDTH] IN BLOOD BY AUTOMATED COUNT: 45.2 FL (ref 35.9–50)
GLUCOSE BLD-MCNC: 154 MG/DL (ref 65–99)
GLUCOSE BLD-MCNC: 180 MG/DL (ref 65–99)
GLUCOSE SERPL-MCNC: 164 MG/DL (ref 65–99)
HCT VFR BLD AUTO: 39.3 % (ref 42–52)
HGB BLD-MCNC: 13.6 G/DL (ref 14–18)
IMM GRANULOCYTES # BLD AUTO: 0.01 K/UL (ref 0–0.11)
IMM GRANULOCYTES NFR BLD AUTO: 0.2 % (ref 0–0.9)
LYMPHOCYTES # BLD AUTO: 1.05 K/UL (ref 1–4.8)
LYMPHOCYTES NFR BLD: 21 % (ref 22–41)
MCH RBC QN AUTO: 33.1 PG (ref 27–33)
MCHC RBC AUTO-ENTMCNC: 34.6 G/DL (ref 33.7–35.3)
MCV RBC AUTO: 95.6 FL (ref 81.4–97.8)
MONOCYTES # BLD AUTO: 0.43 K/UL (ref 0–0.85)
MONOCYTES NFR BLD AUTO: 8.6 % (ref 0–13.4)
NEUTROPHILS # BLD AUTO: 3.34 K/UL (ref 1.82–7.42)
NEUTROPHILS NFR BLD: 66.8 % (ref 44–72)
NRBC # BLD AUTO: 0 K/UL
NRBC BLD-RTO: 0 /100 WBC
PLATELET # BLD AUTO: 125 K/UL (ref 164–446)
PMV BLD AUTO: 9 FL (ref 9–12.9)
POTASSIUM SERPL-SCNC: 3.6 MMOL/L (ref 3.6–5.5)
RBC # BLD AUTO: 4.11 M/UL (ref 4.7–6.1)
SODIUM SERPL-SCNC: 140 MMOL/L (ref 135–145)
UFH PPP CHRO-ACNC: 0.78 IU/ML
WBC # BLD AUTO: 5 K/UL (ref 4.8–10.8)

## 2021-06-27 PROCEDURE — 99239 HOSP IP/OBS DSCHRG MGMT >30: CPT | Performed by: INTERNAL MEDICINE

## 2021-06-27 PROCEDURE — B2151ZZ FLUOROSCOPY OF LEFT HEART USING LOW OSMOLAR CONTRAST: ICD-10-PCS | Performed by: INTERNAL MEDICINE

## 2021-06-27 PROCEDURE — 85025 COMPLETE CBC W/AUTO DIFF WBC: CPT

## 2021-06-27 PROCEDURE — 700105 HCHG RX REV CODE 258: Performed by: INTERNAL MEDICINE

## 2021-06-27 PROCEDURE — 82962 GLUCOSE BLOOD TEST: CPT | Mod: 91

## 2021-06-27 PROCEDURE — 700102 HCHG RX REV CODE 250 W/ 637 OVERRIDE(OP): Performed by: INTERNAL MEDICINE

## 2021-06-27 PROCEDURE — A9270 NON-COVERED ITEM OR SERVICE: HCPCS | Performed by: INTERNAL MEDICINE

## 2021-06-27 PROCEDURE — 93458 L HRT ARTERY/VENTRICLE ANGIO: CPT

## 2021-06-27 PROCEDURE — B2111ZZ FLUOROSCOPY OF MULTIPLE CORONARY ARTERIES USING LOW OSMOLAR CONTRAST: ICD-10-PCS | Performed by: INTERNAL MEDICINE

## 2021-06-27 PROCEDURE — RXMED WILLOW AMBULATORY MEDICATION CHARGE: Performed by: INTERNAL MEDICINE

## 2021-06-27 PROCEDURE — 700102 HCHG RX REV CODE 250 W/ 637 OVERRIDE(OP): Performed by: NURSE PRACTITIONER

## 2021-06-27 PROCEDURE — 700102 HCHG RX REV CODE 250 W/ 637 OVERRIDE(OP): Performed by: STUDENT IN AN ORGANIZED HEALTH CARE EDUCATION/TRAINING PROGRAM

## 2021-06-27 PROCEDURE — 700111 HCHG RX REV CODE 636 W/ 250 OVERRIDE (IP)

## 2021-06-27 PROCEDURE — 700117 HCHG RX CONTRAST REV CODE 255: Performed by: INTERNAL MEDICINE

## 2021-06-27 PROCEDURE — 93458 L HRT ARTERY/VENTRICLE ANGIO: CPT | Mod: 26 | Performed by: INTERNAL MEDICINE

## 2021-06-27 PROCEDURE — 4A023N7 MEASUREMENT OF CARDIAC SAMPLING AND PRESSURE, LEFT HEART, PERCUTANEOUS APPROACH: ICD-10-PCS | Performed by: INTERNAL MEDICINE

## 2021-06-27 PROCEDURE — 99152 MOD SED SAME PHYS/QHP 5/>YRS: CPT | Performed by: INTERNAL MEDICINE

## 2021-06-27 PROCEDURE — A9270 NON-COVERED ITEM OR SERVICE: HCPCS | Performed by: NURSE PRACTITIONER

## 2021-06-27 PROCEDURE — A9270 NON-COVERED ITEM OR SERVICE: HCPCS | Performed by: STUDENT IN AN ORGANIZED HEALTH CARE EDUCATION/TRAINING PROGRAM

## 2021-06-27 PROCEDURE — 99232 SBSQ HOSP IP/OBS MODERATE 35: CPT | Mod: 25 | Performed by: STUDENT IN AN ORGANIZED HEALTH CARE EDUCATION/TRAINING PROGRAM

## 2021-06-27 PROCEDURE — 80048 BASIC METABOLIC PNL TOTAL CA: CPT

## 2021-06-27 PROCEDURE — 85520 HEPARIN ASSAY: CPT

## 2021-06-27 RX ORDER — MIDAZOLAM HYDROCHLORIDE 1 MG/ML
INJECTION INTRAMUSCULAR; INTRAVENOUS
Status: COMPLETED
Start: 2021-06-27 | End: 2021-06-27

## 2021-06-27 RX ORDER — AMIODARONE HYDROCHLORIDE 200 MG/1
200 TABLET ORAL DAILY
Status: DISCONTINUED | OUTPATIENT
Start: 2021-07-12 | End: 2021-06-27 | Stop reason: HOSPADM

## 2021-06-27 RX ORDER — SODIUM CHLORIDE 9 MG/ML
INJECTION, SOLUTION INTRAVENOUS CONTINUOUS
Status: ACTIVE | OUTPATIENT
Start: 2021-06-27 | End: 2021-06-27

## 2021-06-27 RX ORDER — LOSARTAN POTASSIUM 50 MG/1
50 TABLET ORAL DAILY
Qty: 30 TABLET | Refills: 0 | Status: SHIPPED | OUTPATIENT
Start: 2021-06-28

## 2021-06-27 RX ORDER — AMIODARONE HYDROCHLORIDE 200 MG/1
200 TABLET ORAL DAILY
Qty: 58 TABLET | Refills: 0 | Status: SHIPPED | OUTPATIENT
Start: 2021-07-12

## 2021-06-27 RX ORDER — ATORVASTATIN CALCIUM 40 MG/1
40 TABLET, FILM COATED ORAL DAILY
Qty: 30 TABLET | Refills: 0 | Status: SHIPPED | OUTPATIENT
Start: 2021-06-27

## 2021-06-27 RX ORDER — AMIODARONE HYDROCHLORIDE 400 MG/1
400 TABLET ORAL 2 TIMES DAILY
Qty: 28 TABLET | Refills: 0 | Status: SHIPPED | OUTPATIENT
Start: 2021-06-27 | End: 2021-07-15

## 2021-06-27 RX ORDER — AMIODARONE HYDROCHLORIDE 200 MG/1
400 TABLET ORAL TWICE DAILY
Status: DISCONTINUED | OUTPATIENT
Start: 2021-06-27 | End: 2021-06-27 | Stop reason: HOSPADM

## 2021-06-27 RX ORDER — HEPARIN SODIUM 200 [USP'U]/100ML
INJECTION, SOLUTION INTRAVENOUS
Status: COMPLETED
Start: 2021-06-27 | End: 2021-06-27

## 2021-06-27 RX ADMIN — OMEPRAZOLE 40 MG: 20 CAPSULE, DELAYED RELEASE ORAL at 05:12

## 2021-06-27 RX ADMIN — HEPARIN SODIUM: 200 INJECTION, SOLUTION INTRAVENOUS at 08:30

## 2021-06-27 RX ADMIN — LOSARTAN POTASSIUM 50 MG: 50 TABLET, FILM COATED ORAL at 05:12

## 2021-06-27 RX ADMIN — AMIODARONE HYDROCHLORIDE 400 MG: 200 TABLET ORAL at 05:12

## 2021-06-27 RX ADMIN — SODIUM CHLORIDE: 9 INJECTION, SOLUTION INTRAVENOUS at 09:22

## 2021-06-27 RX ADMIN — RIVAROXABAN 15 MG: 15 TABLET, FILM COATED ORAL at 12:10

## 2021-06-27 RX ADMIN — MIDAZOLAM HYDROCHLORIDE 2 MG: 1 INJECTION, SOLUTION INTRAMUSCULAR; INTRAVENOUS at 08:41

## 2021-06-27 RX ADMIN — IOHEXOL 52 ML: 350 INJECTION, SOLUTION INTRAVENOUS at 09:15

## 2021-06-27 RX ADMIN — FENTANYL CITRATE 50 MCG: 50 INJECTION, SOLUTION INTRAMUSCULAR; INTRAVENOUS at 08:53

## 2021-06-27 RX ADMIN — ASPIRIN 81 MG: 81 TABLET, COATED ORAL at 05:12

## 2021-06-27 ASSESSMENT — ENCOUNTER SYMPTOMS
SHORTNESS OF BREATH: 0
COUGH: 0
WHEEZING: 0
APNEA: 0
CHOKING: 0
CHEST TIGHTNESS: 0
STRIDOR: 0

## 2021-06-27 NOTE — PROGRESS NOTES
Pt recovered from catheterization. Right radial site dressed with gauze and transparent occlusive dressing. Pt educated to not bear weight on wrist for 48hrs. OK to discharge per LATANYA CARPIO. Discharge [facility] orders written. Telemetry and IVs removed. Pt dressed and awaiting transport to [facility].

## 2021-06-27 NOTE — DISCHARGE INSTRUCTIONS
Discharge Instructions    Discharged to home by car with relative. Discharged via wheelchair, hospital escort: Yes.  Special equipment needed: Not Applicable    Be sure to schedule a follow-up appointment with your primary care doctor or any specialists as instructed.     Discharge Plan:   Diet Plan: Discussed  Activity Level: Discussed  Confirmed Follow up Appointment: Patient to Call and Schedule Appointment  Confirmed Symptoms Management: Discussed  Medication Reconciliation Updated: Yes    I understand that a diet low in cholesterol, fat, and sodium is recommended for good health. Unless I have been given specific instructions below for another diet, I accept this instruction as my diet prescription.   Other diet: as tolerated    Special Instructions: None    · Is patient discharged on Warfarin / Coumadin?   No       Depression / Suicide Risk    As you are discharged from this [facility] facility, it is important to learn how to keep safe from harming yourself.    Recognize the warning signs:  · Abrupt changes in personality, positive or negative- including increase in energy   · Giving away possessions  · Change in eating patterns- significant weight changes-  positive or negative  · Change in sleeping patterns- unable to sleep or sleeping all the time   · Unwillingness or inability to communicate  · Depression  · Unusual sadness, discouragement and loneliness  · Talk of wanting to die  · Neglect of personal appearance   · Rebelliousness- reckless behavior  · Withdrawal from people/activities they love  · Confusion- inability to concentrate     If you or a loved one observes any of these behaviors or has concerns about self-harm, here's what you can do:  · Talk about it- your feelings and reasons for harming yourself  · Remove any means that you might use to hurt yourself (examples: pills, rope, extension cords, firearm)  · Get professional help from the community (Mental Health, Substance Abuse,  psychological counseling)  · Do not be alone:Call your Safe Contact- someone whom you trust who will be there for you.  · Call your local CRISIS HOTLINE 456-1988 or 359-017-6208  · Call your local Children's Mobile Crisis Response Team [place] (249) 275-8507 or www.[employer]  · Call the toll free National Suicide Prevention Hotlines   · National Suicide Prevention Lifeline 030-537-ZHOK (7373)  · National Hope Line Network 800-SUICIDE (208-0274)    Diabetes Mellitus and Nutrition, Adult  When you have diabetes (diabetes mellitus), it is very important to have healthy eating habits because your blood sugar (glucose) levels are greatly affected by what you eat and drink. Eating healthy foods in the appropriate amounts, at about the same times every day, can help you:  · Control your blood glucose.  · Lower your risk of heart disease.    Cardiac Arrhythmia  Your heart is a muscle that works to pump blood through your body by regular contractions. The beating of your heart is controlled by a system of special pacemaker cells. These cells control the electrical activity of the heart. When the system controlling this regular beating is disturbed, a heart rhythm abnormality (arrhythmia) results.  WHEN YOUR HEART SKIPS A BEAT  One of the most common and least serious heart arrhythmias is called an ectopic or premature atrial heartbeat (PAC). This may be noticed as a small change in your regular pulse. A PAC originates from the top part (atrium) of the heart. Within the right atrium, the SA node is the area that normally controls the regularity of the heart. PACs occur in heart tissue outside of the SA node region. You may feel this as a skipped beat or heart flutter, especially if several occur in succession or occur frequently.   Another arrhythmia is ventricular premature complex (VCP or PVC). These extra beats start out in the bottom, more muscular chambers of the heart. In most cases a PVC is harmless. If  there are underlying causes that are making the heart irritable such as an overactive thyroid or a prior heart attack PVCs may be of more concern. In a few cases, medications to control the heart rhythm may be prescribed.  Things to try at home:  Cut down or avoid alcohol, tobacco and caffeine.  Get enough sleep.  Reduce stress.  Exercise more.  WHEN THE HEART BEATS TOO FAST  Atrial tachycardia is a fast heart rate, which starts out in the atrium. It may last from minutes to much longer. Your heart may beat 140 to 240 times per minute instead of the normal 60 to 100.  Symptoms include a worried feeling (anxiety) and a sense that your heart is beating fast and hard.  You may be able to stop the fast rate by holding your breath or bearing down as if you were going to have a bowel movement.  This type of fast rate is usually not dangerous.  Atrial fibrillation and atrial flutter are other fast rhythms that start in the atria. Both conditions keep the atria from filling with enough blood so the heart does not work well.  Symptoms include feeling light-headed or faint.  These fast rates may be the result of heart damage or disease. Too much thyroid hormone may play a role.  There may be no clear cause or it may be from heart disease or damage.  Medication or a special electrical treatment (cardioversion) may be needed to get the heart beating normally.  Ventricular tachycardia is a fast heart rate that starts in the lower muscular chambers (ventricles) This is a serious disorder that requires treatment as soon as possible. You need someone else to get and use a small defibrillator.  Symptoms include collapse, chest pain, or being short of breath.  Treatment may include medication, procedures to improve blood flow to the heart, or an implantable cardiac defibrillator (ICD).  DIAGNOSIS   A cardiogram (EKG or ECG) will be done to see the arrhythmia, as well as lab tests to check the underlying cause.  If the extra beats or  fast rate come and go, you may wear a Holter monitor that records your heart rate for a longer period of time.  SEEK MEDICAL CARE IF:  You have irregular or fast heartbeats (palpitations).  You experience skipped beats.  You develop lightheadedness.  You have chest discomfort.  You have shortness of breath.  You have more frequent episodes, if you are already being treated.  SEEK IMMEDIATE MEDICAL CARE IF:   You have severe chest pain, especially if the pain is crushing or pressure-like and spreads to the arms, back, neck, or jaw, or if you have sweating, feeling sick to your stomach (nausea), or shortness of breath. THIS IS AN EMERGENCY. Do not wait to see if the pain will go away. Get medical help at once. Call 911 or 0 (). DO NOT drive yourself to the hospital.  You feel dizzy or faint.  You have episodes of previously documented atrial tachycardia that do not resolve with the techniques your caregiver has taught you.  Irregular or rapid heartbeats begin to occur more often than in the past, especially if they are associated with more pronounced symptoms or of longer duration.  Document Released: 12/18/2006 Document Revised: 03/11/2013 Document Reviewed: 08/05/2009  [email or web address]® Patient Information ©2014 [employer].  ·     Bleeding Precautions When on Anticoagulant Therapy, Adult  Anticoagulant therapy, also called blood thinner therapy, is medicine that helps to prevent and treat blood clots. The medicine works by stopping blood clots from forming or growing. Blood clots that form in your blood vessels can be dangerous. They can break loose and travel to the heart, lungs, or brain. This increases the risk of a heart attack, stroke, or blocked lung artery (pulmonary embolism).  Anticoagulants also increase the risk of bleeding. Try to protect yourself from cuts and other injuries that can cause bleeding. It is important to take anticoagulants exactly as told by your health care provider.  Why do I need to  be on anticoagulant therapy?  You may need this medicine if you are at risk of developing a blood clot. Conditions that increase your risk of a blood clot include:  Being born with heart disease or a heart malformation (congenital heart disease).  Developing heart disease.  Having had surgery, such as valve replacement.  Having had a serious accident or other type of severe injury (trauma).  Having certain types of cancer.  Having certain diseases that can increase blood clotting.  Having a high risk of stroke or heart attack.  Having atrial fibrillation (AF).  What are the common anticoagulant medicines?  There are several types of anticoagulant medicines. The most common types are:  Medicines that you take by mouth (oral medicines), such as:  Warfarin.  Novel oral anticoagulants (NOACs), such as:  Direct thrombin inhibitors (dabigatran).  Factor Xa inhibitors (apixaban, edoxaban, and rivaroxaban).  Injections, such as:  Unfractionated heparin.  Low molecular weight heparin.  These anticoagulants work in different ways to prevent blood clots. They also have different risks and side effects.  What do I need to remember while on anticoagulant therapy?  Taking anticoagulants  Take your medicine at the same time every day. If you forget to take your medicine, take it as soon as you remember. Do not double your dosage of medicine if you miss a whole day. Take your normal dose and call your health care provider.  Do not stop taking your medicine unless your health care provider approves. Stopping the medicine can increase your risk of developing a blood clot.  Taking other medicines  Take over-the-counter and prescriptions medicines only as told by your health care provider.  Do not take over-the-counter NSAIDs, including aspirin and ibuprofen, while you are on anticoagulant therapy. These medicines increase your risk of dangerous bleeding.  Get approval from your health care provider before you start taking any new  medicines, vitamins, or herbal products. Some of these could interfere with your therapy.  General instructions  Keep all follow-up visits as told by your health care provider. This is important.  If you are pregnant or trying to get pregnant, talk with a health care provider about anticoagulants. Some of these medicines are not safe to take during pregnancy.  Tell all health care providers, including your dentist, that you are on anticoagulant therapy. It is especially important to tell providers before you have any surgery, medical procedures, or dental work done.  What precautions should I take?    Be very careful when using knives, scissors, or other sharp objects.  Use an electric razor instead of a blade.  Do not use toothpicks.  Use a soft-bristled toothbrush. Brush your teeth gently.  Always wear shoes outdoors and wear slippers indoors.  Be careful when cutting your fingernails and toenails.  Place bath mats in the bathroom. If possible, install handrails as well.  Wear gloves while you do yard work.  Wear your seat belt.  Prevent falls by removing loose rugs and extension cords from areas where you walk. Use a cane or walker if you need it.  Avoid constipation by:  Drinking enough fluid to keep your urine clear or pale yellow.  Eating foods that are high in fiber, such as fresh fruits and vegetables, whole grains, and beans.  Limiting foods that are high in fat and processed sugars, such as fried and sweet foods.  Do not play contact sports or participate in other activities that have a high risk for injury.  What other precautions are important if on warfarin therapy?  If you are taking a type of anticoagulant called warfarin, make sure you:  Work with a diet and nutrition specialist (dietitian) to make an eating plan. Do not make any sudden changes to your diet after you have started your eating plan.  Do not drink alcohol. It can interfere with your medicine and increase your risk of an injury that  causes bleeding.  Get regular blood tests as told by your health care provider.  What are some questions to ask my health care provider?  Why do I need anticoagulant therapy?  What is the best anticoagulant therapy for my condition?  How long will I need anticoagulant therapy?  What are the side effects of anticoagulant therapy?  When should I take my medicine? What should I do if I forget to take it?  Will I need to have regular blood tests?  Do I need to change my diet? Are there foods or drinks that I should avoid?  What activities are safe for me?  What should I do if I want to get pregnant?  Contact a health care provider if:  You miss a dose of medicine:  And you are not sure what to do.  For more than one day.  You have:  Menstrual bleeding that is heavier than normal.  Bloody or brown urine.  Easy bruising.  Black and tarry stool or bright red stool.  Side effects from your medicine.  You feel weak or dizzy.  You become pregnant.  Get help right away if:  You have bleeding that will not stop within 20 minutes from:  The nose.  The gums.  A cut on the skin.  You have a severe headache or stomachache.  You vomit or cough up blood.  You fall or hit your head.  Summary  Anticoagulant therapy, also called blood thinner therapy, is medicine that helps to prevent and treat blood clots.  Anticoagulants work in different ways to prevent blood clots. They also have different risks and side effects.  Talk with your health care provider about any precautions that you should take while on anticoagulant therapy.  This information is not intended to replace advice given to you by your health care provider. Make sure you discuss any questions you have with your health care provider.  Document Released: 11/28/2016 Document Revised: 04/08/2020 Document Reviewed: 03/06/2018  Elsevier Patient Education © 2020 Elsevier Inc.  ·   · Improve your blood pressure.  · Reach or maintain a healthy weight.  Every person with diabetes is  different, and each person has different needs for a meal plan. Your health care provider may recommend that you work with a diet and nutrition specialist (dietitian) to make a meal plan that is best for you. Your meal plan may vary depending on factors such as:  · The calories you need.  · The medicines you take.  · Your weight.  · Your blood glucose, blood pressure, and cholesterol levels.  · Your activity level.  · Other health conditions you have, such as heart or kidney disease.  How do carbohydrates affect me?  Carbohydrates, also called carbs, affect your blood glucose level more than any other type of food. Eating carbs naturally raises the amount of glucose in your blood. Carb counting is a method for keeping track of how many carbs you eat. Counting carbs is important to keep your blood glucose at a healthy level, especially if you use insulin or take certain oral diabetes medicines.  It is important to know how many carbs you can safely have in each meal. This is different for every person. Your dietitian can help you calculate how many carbs you should have at each meal and for each snack.  Foods that contain carbs include:  · Bread, cereal, rice, pasta, and crackers.  · Potatoes and corn.  · Peas, beans, and lentils.  · Milk and yogurt.  · Fruit and juice.  · Desserts, such as cakes, cookies, ice cream, and candy.  How does alcohol affect me?  Alcohol can cause a sudden decrease in blood glucose (hypoglycemia), especially if you use insulin or take certain oral diabetes medicines. Hypoglycemia can be a life-threatening condition. Symptoms of hypoglycemia (sleepiness, dizziness, and confusion) are similar to symptoms of having too much alcohol.  If your health care provider says that alcohol is safe for you, follow these guidelines:  · Limit alcohol intake to no more than 1 drink per day for nonpregnant women and 2 drinks per day for men. One drink equals 12 oz of beer, 5 oz of wine, or 1½ oz of hard  "liquor.  · Do not drink on an empty stomach.  · Keep yourself hydrated with water, diet soda, or unsweetened iced tea.  · Keep in mind that regular soda, juice, and other mixers may contain a lot of sugar and must be counted as carbs.  What are tips for following this plan?    Reading food labels  · Start by checking the serving size on the \"Nutrition Facts\" label of packaged foods and drinks. The amount of calories, carbs, fats, and other nutrients listed on the label is based on one serving of the item. Many items contain more than one serving per package.  · Check the total grams (g) of carbs in one serving. You can calculate the number of servings of carbs in one serving by dividing the total carbs by 15. For example, if a food has 30 g of total carbs, it would be equal to 2 servings of carbs.  · Check the number of grams (g) of saturated and trans fats in one serving. Choose foods that have low or no amount of these fats.  · Check the number of milligrams (mg) of salt (sodium) in one serving. Most people should limit total sodium intake to less than 2,300 mg per day.  · Always check the nutrition information of foods labeled as \"low-fat\" or \"nonfat\". These foods may be higher in added sugar or refined carbs and should be avoided.  · Talk to your dietitian to identify your daily goals for nutrients listed on the label.  Shopping  · Avoid buying canned, premade, or processed foods. These foods tend to be high in fat, sodium, and added sugar.  · Shop around the outside edge of the grocery store. This includes fresh fruits and vegetables, bulk grains, fresh meats, and fresh dairy.  Cooking  · Use low-heat cooking methods, such as baking, instead of high-heat cooking methods like deep frying.  · Cook using healthy oils, such as olive, canola, or sunflower oil.  · Avoid cooking with butter, cream, or high-fat meats.  Meal planning  · Eat meals and snacks regularly, preferably at the same times every day. Avoid going " long periods of time without eating.  · Eat foods high in fiber, such as fresh fruits, vegetables, beans, and whole grains. Talk to your dietitian about how many servings of carbs you can eat at each meal.  · Eat 4-6 ounces (oz) of lean protein each day, such as lean meat, chicken, fish, eggs, or tofu. One oz of lean protein is equal to:  ? 1 oz of meat, chicken, or fish.  ? 1 egg.  ? ¼ cup of tofu.  · Eat some foods each day that contain healthy fats, such as avocado, nuts, seeds, and fish.  Lifestyle  · Check your blood glucose regularly.  · Exercise regularly as told by your health care provider. This may include:  ? 150 minutes of moderate-intensity or vigorous-intensity exercise each week. This could be brisk walking, biking, or water aerobics.  ? Stretching and doing strength exercises, such as yoga or weightlifting, at least 2 times a week.  · Take medicines as told by your health care provider.  · Do not use any products that contain nicotine or tobacco, such as cigarettes and e-cigarettes. If you need help quitting, ask your health care provider.  · Work with a counselor or diabetes educator to identify strategies to manage stress and any emotional and social challenges.  Questions to ask a health care provider  · Do I need to meet with a diabetes educator?  · Do I need to meet with a dietitian?  · What number can I call if I have questions?  · When are the best times to check my blood glucose?  Where to find more information:  · American Diabetes Association: diabetes.org  · Academy of Nutrition and Dietetics: www.eatright.org  · National [place] of Diabetes and Digestive and Kidney Diseases (NIH): www.niddk.nih.gov  Summary  · A healthy meal plan will help you control your blood glucose and maintain a healthy lifestyle.  · Working with a diet and nutrition specialist (dietitian) can help you make a meal plan that is best for you.  · Keep in mind that carbohydrates (carbs) and alcohol have immediate  effects on your blood glucose levels. It is important to count carbs and to use alcohol carefully.  This information is not intended to replace advice given to you by your health care provider. Make sure you discuss any questions you have with your health care provider.  Document Released: 09/14/2006 Document Revised: 11/30/2018 Document Reviewed: 01/22/2018  Elsevier Patient Education © 2020 Elsevier Inc.

## 2021-06-27 NOTE — PROGRESS NOTES
D/c instructions given to pt regarding medications and follow up appt. Educated on worsening s/s, pt understands and questions answered.  meds delivered at bedside and pt will follow up with cardiology in [place]

## 2021-06-27 NOTE — PROGRESS NOTES
Monitor  Summary:    SR/SB 59-92  Rare PVC/Bigeminy/Couplets/2nd Degree HB beats/1st degree HB  .26/.11/.46

## 2021-06-27 NOTE — PROGRESS NOTES
Cardiology Follow Up Progress Note    Date of Service  6/27/2021    Attending Physician  Mohit Farias M.D.    Chief Complaint   Shortness of breath    HPI  Judd Dodson is a 75 y.o. male with a  PMH significant for DVT (2 years ago treated with Xarelto), history of intracardiac thrombosis, previously treated with Xarelto, dyslipidemia, type 2 diabetes presented with shortness of breath found to have moderate-sized pulmonary emboli on CTA chest without evidence of RV strain.    Patient was transferred from [place].    Cardiology consultation for nonsustained V. Tach.      Interim Events    No overnight cardiac events.  No recurrence of NSVT on p.o. amiodarone  No evidence of heart block overnight or this morning..  Underwent LHC this morning, mild CAD.  Keep K > 4, Mg> 2  /79  LVEF 40%  Stop IV heparin  Start DOAC for PE  Stable for discharge from cardiac stand  Patient would like to follow-up with his own cardiologist in [place].      Review of Systems  Review of Systems   Respiratory: Negative for apnea, cough, choking, chest tightness, shortness of breath, wheezing and stridor.        Vital signs in last 24 hours  Temp:  [36.3 °C (97.4 °F)-37.1 °C (98.8 °F)] 36.3 °C (97.4 °F)  Pulse:  [60-95] 67  Resp:  [16] 16  BP: (101-154)/() 130/100  SpO2:  [93 %-96 %] 93 %    Physical Exam  Physical Exam  Cardiovascular:      Rate and Rhythm: Normal rate and regular rhythm.      Pulses: Normal pulses.   Pulmonary:      Effort: Pulmonary effort is normal.   Skin:     General: Skin is warm.      Comments: Right radial TR intact, good circulation   Neurological:      Mental Status: He is alert. Mental status is at baseline.   Psychiatric:         Mood and Affect: Mood normal.         Lab Review  Lab Results   Component Value Date/Time    WBC 5.0 06/27/2021 03:17 AM    RBC 4.11 (L) 06/27/2021 03:17 AM    HEMOGLOBIN 13.6 (L) 06/27/2021 03:17 AM    HEMATOCRIT 39.3 (L) 06/27/2021 03:17 AM    MCV 95.6 06/27/2021 03:17  AM    MCH 33.1 (H) 06/27/2021 03:17 AM    MCHC 34.6 06/27/2021 03:17 AM    MPV 9.0 06/27/2021 03:17 AM      Lab Results   Component Value Date/Time    SODIUM 140 06/27/2021 03:17 AM    POTASSIUM 3.6 06/27/2021 03:17 AM    CHLORIDE 108 06/27/2021 03:17 AM    CO2 20 06/27/2021 03:17 AM    GLUCOSE 164 (H) 06/27/2021 03:17 AM    BUN 11 06/27/2021 03:17 AM    CREATININE 0.85 06/27/2021 03:17 AM      Lab Results   Component Value Date/Time    ASTSGOT 23 06/25/2021 01:08 AM    ALTSGPT 15 06/25/2021 01:08 AM     Lab Results   Component Value Date/Time    TROPONINT 14 06/24/2021 03:45 PM       No results for input(s): NTPROBNP in the last 72 hours.    Cardiac Imaging and Procedures Review  EKG: [facility]    Echocardiogram: 6/24/2021  Moderately reduced left ventricular systolic function.  Ejection fraction is measured to be 40%.  Severe hypokinesis of the basal to apical inferior wall, mid anterior   septum.  Aortic sclerosis without stenosis.  Unable to estimate RVSP/RAP.        Cardiac Catheterization:   6/27/2021  Mild nonobstructive CAD    Imaging  Chest X-Ray:      Stress Test: Not applicable    Assessment/Plan    Nonsustained V. Tach  -NO recurrence.  -s/p C mild CAD  -On amiodarone 400 mg BID  -Continue with amiodarone 400 twice daily x2 weeks, then 200 mg daily, with close follow-up with his own cardiologist in [place].  -High risk medication requires close monitoring.  -Continue with atorvastatin 40 mg, no aspirin ( will start DOACs for PE, )  -LVE 40%    Pulmonary embolism  -IV heparin drip  -Transition to DOAC post cath    Cardiomyopathy, LVEF 40%  -GDMT  - losartan, hold BB for bradycardia    type 2 diabetes  -A1c 6.5  -On Metformin at home    Dyslipidemia  -On atorvastatin    Cardiology will  sign off.  Patient is to follow-up with his own cardiologist in [place].    Thank you for allowing me to participate in the care of this patient.      Please contact me with any questions.    EMIGDIO Leung    Cardiologist, [facility]  [phone]

## 2021-06-27 NOTE — DISCHARGE SUMMARY
Discharge Summary    CHIEF COMPLAINT ON ADMISSION  Chief Complaint   Patient presents with   • Shortness of Breath     transfer in with dx PE   • Other     episodes non sustained V-tach.       Reason for Admission  ems     Admission Date  6/24/2021    CODE STATUS  Full Code    HPI & HOSPITAL COURSE  75 y.o. male with  with past medical history of DVT diagnosed 2 years ago treated with Xarelto temporarily, reported history of intracardial thrombosis several years ago treated with Xarelto for 6 weeks, GERD, dyslipidemia, type 2 diabetes, who was found to have a moderate-sized PE in the right side on CTA chest without evidence of RV strain and was started on IV heparin.  He was noted to have multiple runs of V. tach for which he was started on IV amiodarone.  Patient was admitted to telemetry unit with close cardiac monitoring.  He underwent a 2D echo that revealed an LVEF of 40% with severe hypokinesis of the basal to apical inferior wall and mid anterior septum.  He underwent cardiac catheterization that revealed mild diffuse disease.  He was noted to have nonsustained ventricular tachycardia for which he will be discharged on amiodarone.  He will be discharged on Xarelto.  He has been instructed to follow-up with his PCP and cardiology      Therefore, he is discharged in good and stable condition to home with close outpatient follow-up.    The patient met 2-midnight criteria for an inpatient stay at the time of discharge.    Discharge Date  6/27/21    FOLLOW UP ITEMS POST DISCHARGE  Follow up with cardiology    DISCHARGE DIAGNOSES  Principal Problem:    Pulmonary embolism (HCC) POA: Yes  Active Problems:    Cardiomyopathy (HCC) POA: Yes    Type 2 diabetes mellitus (HCC) POA: Yes    Dyslipidemia POA: Yes    BPH (benign prostatic hyperplasia) POA: Yes  Resolved Problems:    Ventricular tachycardia (HCC) POA: Unknown      FOLLOW UP  No future appointments.  Melva Caldwell M.D.  [address] [facility] [address]  [facility]  [address]  [phone]    Schedule an appointment as soon as possible for a visit in 1 week        MEDICATIONS ON DISCHARGE     Medication List      START taking these medications      Instructions   * amiodarone 400 MG tablet  Commonly known as: PACERONE   Take 1 tablet by mouth 2 times a day for 14 days.  Dose: 400 mg     * amiodarone 200 MG Tabs  Start taking on: July 12, 2021  Commonly known as: Cordarone   Take 2 tablets by mouth twice daily for 14 days, then take 1 tablet every day thereafter.  Dose: 200 mg     losartan 50 MG Tabs  Start taking on: June 28, 2021  Commonly known as: COZAAR   Take 1 tablet by mouth every day.  Dose: 50 mg     * rivaroxaban 15 MG Tabs tablet  Commonly known as: XARELTO   Take 1 tablet by mouth 2 times a day with meals.  Dose: 15 mg     * rivaroxaban 20 MG Tabs tablet  Start taking on: July 18, 2021  Commonly known as: XARELTO   Take 1 tablet by mouth with dinner.  Dose: 20 mg         * This list has 4 medication(s) that are the same as other medications prescribed for you. Read the directions carefully, and ask your doctor or other care provider to review them with you.            CHANGE how you take these medications      Instructions   atorvastatin 40 MG Tabs  What changed:   · medication strength  · how much to take  · when to take this  Commonly known as: LIPITOR   Take 1 tablet by mouth every day.  Dose: 40 mg        CONTINUE taking these medications      Instructions   Icosapent Ethyl 1 g Caps   Take 2 g by mouth 2 times a day.  Dose: 2 g     metFORMIN 500 MG Tabs  Commonly known as: GLUCOPHAGE   Take 500 mg by mouth 2 times a day with meals.  Dose: 500 mg     pantoprazole 40 MG Tbec  Commonly known as: PROTONIX   Take 40 mg by mouth every day.  Dose: 40 mg     tamsulosin 0.4 MG capsule  Commonly known as: FLOMAX   Take 0.4 mg by mouth 1/2 hour after breakfast.  Dose: 0.4 mg            Allergies  No Known Allergies    DIET  Orders Placed This Encounter   Procedures   •  Diet Order Diet: Cardiac     Standing Status:   Standing     Number of Occurrences:   1     Order Specific Question:   Diet:     Answer:   Cardiac [6]       ACTIVITY  As tolerated.  Weight bearing as tolerated    CONSULTATIONS  Cardiology    PROCEDURES  Cardiac catheterization  Findings:  1.  Left main coronary artery:  Normal.  2.  Left anterior descending artery:  Diffuse mild disease two diagonals are noted.  3.  Left circumflex coronary artery:  Diffuse mild disease.  4.  Right coronary artery:  Diffuse mild disease, slow flow.  This is a right dominant system.  5.  Left ventricular end diastolic pressure:  10 mmHg.  No signficant gradient across the aortic valve.  6.  Left ventriculogram:  Ejection fraction of 55-60%.      LABORATORY  Lab Results   Component Value Date    SODIUM 140 06/27/2021    POTASSIUM 3.6 06/27/2021    CHLORIDE 108 06/27/2021    CO2 20 06/27/2021    GLUCOSE 164 (H) 06/27/2021    BUN 11 06/27/2021    CREATININE 0.85 06/27/2021        Lab Results   Component Value Date    WBC 5.0 06/27/2021    HEMOGLOBIN 13.6 (L) 06/27/2021    HEMATOCRIT 39.3 (L) 06/27/2021    PLATELETCT 125 (L) 06/27/2021        Total time of the discharge process exceeds 40 minutes.

## 2021-06-27 NOTE — CARE PLAN
The patient is Stable - Low risk of patient condition declining or worsening    Shift Goals  Clinical Goals: cath lab  Patient Goals: cath lab    Progress made toward(s) clinical / shift goals:    Problem: Knowledge Deficit - Standard  Goal: Patient and family/care givers will demonstrate understanding of plan of care, disease process/condition, diagnostic tests and medications  Outcome: Progressing     Problem: Pain - Standard  Goal: Alleviation of pain or a reduction in pain to the patient’s comfort goal  Outcome: Met          Problem: Acute Care of the Cardiac Cath Patient  Goal: Pre Procedure Optimal Outcome for the Cardiac Cath Patient  Outcome: Progressing       Patient is not progressing towards the following goals:

## 2021-06-27 NOTE — DISCHARGE PLANNING
Meds-to-Beds: Discharge prescription orders listed below delivered to patient's bedside. RN notified. Patient will call with questions.     Judd Dodson   Home Medication Instructions STEPHANIE:[id number]    Printed on:06/27/21 2083   Medication Information                      amiodarone (CORDARONE) 200 MG Tab  Take 2 tablets by mouth twice daily for 14 days, then take 1 tablet every day thereafter.             atorvastatin (LIPITOR) 40 MG Tab  Take 1 tablet by mouth every day.             losartan (COZAAR) 50 MG Tab  Take 1 tablet by mouth every day.             rivaroxaban (XARELTO) 15 MG Tab tablet  Take 1 tablet by mouth 2 times a day with meals.             rivaroxaban (XARELTO) 20 MG Tab tablet  Take 1 tablet by mouth with dinner.                 Joanie Santiago, PharmD

## 2021-06-27 NOTE — PROCEDURES
Cardiac Catheterization report    6/27/2021  8:54 AM    Referring MD:     Indication for procedure: Cardiomyopathy    Procedures performed:  · Coronary arteriograms  · Left heart catheterization and Left ventriculogram     Final impression:  Mild non-obstructive coronary artery disease    Recommendations:  Guideline directed medical therapy   Cardiovascular Risk factor modification    Findings:  1.  Left main coronary artery:  Normal.  2.  Left anterior descending artery:  Diffuse mild disease two diagonals are noted.  3.  Left circumflex coronary artery:  Diffuse mild disease.  4.  Right coronary artery:  Diffuse mild disease, slow flow.  This is a right dominant system.  5.  Left ventricular end diastolic pressure:  10 mmHg.  No signficant gradient across the aortic valve.  6.  Left ventriculogram:  Ejection fraction of 55-60%.      EBL: <10 CC    Specimens: None    Procedure details:  The risks and benefits of cardiac catheterization and possible intervention were explained to the patient including death, heart attack, stroke, and emergency surgery.  The patient verbalized understanding and wished to proceed.  The patient was brought to the cardiac catheterization laboratory in the fasting state and prepped and draped in the usual sterile fashion.  The right wrist was locally anesthetized with lidocaine and the right radial artery was cannulated with 5/6-[place] equipment and standard radial cocktail was given.  Coronary angiography was performed using JR 4 and JL 3.5  diagnostic catheters in the usual fashion, results mentioned below.  JR 4 catheter was used to cross the aortic valve to perform  left heart catheterization and left ventriculogram.    Once all the views were obtained, all wires and catheters were removed from the patient without difficulty.  A Vasc-Band was placed over the right radial artery and the radial artery sheath was removed without difficulty.      Complications:  None    Sedation  time:  I supervised moderate sedation over a trained independent nursing staff,  Sedation Start time:  08:33   Sedation Stop time: 08:48      JESSICA Campbell  [facility]

## 2021-06-27 NOTE — PROGRESS NOTES
Monitor Summary: .24/.10/.46 SB-ST 1st degree HB   Rare PVC, Bigeminy, Trigeminy,Couplet, Triplet